# Patient Record
Sex: MALE | Race: BLACK OR AFRICAN AMERICAN | NOT HISPANIC OR LATINO
[De-identification: names, ages, dates, MRNs, and addresses within clinical notes are randomized per-mention and may not be internally consistent; named-entity substitution may affect disease eponyms.]

---

## 2018-04-06 ENCOUNTER — TRANSCRIPTION ENCOUNTER (OUTPATIENT)
Age: 53
End: 2018-04-06

## 2018-04-06 ENCOUNTER — INPATIENT (INPATIENT)
Facility: HOSPITAL | Age: 53
LOS: 1 days | Discharge: HOME | End: 2018-04-08
Attending: INTERNAL MEDICINE | Admitting: INTERNAL MEDICINE
Payer: COMMERCIAL

## 2018-04-06 VITALS
HEART RATE: 97 BPM | RESPIRATION RATE: 18 BRPM | SYSTOLIC BLOOD PRESSURE: 137 MMHG | TEMPERATURE: 99 F | DIASTOLIC BLOOD PRESSURE: 100 MMHG

## 2018-04-06 DIAGNOSIS — R07.9 CHEST PAIN, UNSPECIFIED: ICD-10-CM

## 2018-04-06 DIAGNOSIS — D15.0 BENIGN NEOPLASM OF THYMUS: ICD-10-CM

## 2018-04-06 LAB
ALBUMIN SERPL ELPH-MCNC: 4.5 G/DL — SIGNIFICANT CHANGE UP (ref 3.3–5)
ALP SERPL-CCNC: 76 U/L — SIGNIFICANT CHANGE UP (ref 40–120)
ALT FLD-CCNC: 50 U/L — HIGH (ref 4–41)
ANISOCYTOSIS BLD QL: SLIGHT — SIGNIFICANT CHANGE UP
AST SERPL-CCNC: 31 U/L — SIGNIFICANT CHANGE UP (ref 4–40)
BASOPHILS # BLD AUTO: 0.06 K/UL — SIGNIFICANT CHANGE UP (ref 0–0.2)
BASOPHILS NFR BLD AUTO: 0.8 % — SIGNIFICANT CHANGE UP (ref 0–2)
BASOPHILS NFR SPEC: 0 % — SIGNIFICANT CHANGE UP (ref 0–2)
BILIRUB SERPL-MCNC: 0.3 MG/DL — SIGNIFICANT CHANGE UP (ref 0.2–1.2)
BUN SERPL-MCNC: 13 MG/DL — SIGNIFICANT CHANGE UP (ref 7–23)
CALCIUM SERPL-MCNC: 9.8 MG/DL — SIGNIFICANT CHANGE UP (ref 8.4–10.5)
CHLORIDE SERPL-SCNC: 98 MMOL/L — SIGNIFICANT CHANGE UP (ref 98–107)
CK MB BLD-MCNC: 1.09 NG/ML — SIGNIFICANT CHANGE UP (ref 1–6.6)
CK MB BLD-MCNC: SIGNIFICANT CHANGE UP (ref 0–2.5)
CK SERPL-CCNC: 92 U/L — SIGNIFICANT CHANGE UP (ref 30–200)
CO2 SERPL-SCNC: 29 MMOL/L — SIGNIFICANT CHANGE UP (ref 22–31)
CREAT SERPL-MCNC: 1.04 MG/DL — SIGNIFICANT CHANGE UP (ref 0.5–1.3)
EOSINOPHIL # BLD AUTO: 0.13 K/UL — SIGNIFICANT CHANGE UP (ref 0–0.5)
EOSINOPHIL NFR BLD AUTO: 1.7 % — SIGNIFICANT CHANGE UP (ref 0–6)
EOSINOPHIL NFR FLD: 1.4 % — SIGNIFICANT CHANGE UP (ref 0–6)
GLUCOSE SERPL-MCNC: 114 MG/DL — HIGH (ref 70–99)
HCT VFR BLD CALC: 56.2 % — HIGH (ref 39–50)
HGB BLD-MCNC: 16.9 G/DL — SIGNIFICANT CHANGE UP (ref 13–17)
IMM GRANULOCYTES # BLD AUTO: 0.01 # — SIGNIFICANT CHANGE UP
IMM GRANULOCYTES NFR BLD AUTO: 0.1 % — SIGNIFICANT CHANGE UP (ref 0–1.5)
LYMPHOCYTES # BLD AUTO: 2.57 K/UL — SIGNIFICANT CHANGE UP (ref 1–3.3)
LYMPHOCYTES # BLD AUTO: 33.3 % — SIGNIFICANT CHANGE UP (ref 13–44)
LYMPHOCYTES NFR SPEC AUTO: 18.6 % — SIGNIFICANT CHANGE UP (ref 13–44)
MANUAL SMEAR VERIFICATION: SIGNIFICANT CHANGE UP
MCHC RBC-ENTMCNC: 21.6 PG — LOW (ref 27–34)
MCHC RBC-ENTMCNC: 30.1 % — LOW (ref 32–36)
MCV RBC AUTO: 72 FL — LOW (ref 80–100)
MONOCYTES # BLD AUTO: 0.62 K/UL — SIGNIFICANT CHANGE UP (ref 0–0.9)
MONOCYTES NFR BLD AUTO: 8 % — SIGNIFICANT CHANGE UP (ref 2–14)
MONOCYTES NFR BLD: 7.2 % — SIGNIFICANT CHANGE UP (ref 2–9)
NEUTROPHIL AB SER-ACNC: 66.4 % — SIGNIFICANT CHANGE UP (ref 43–77)
NEUTROPHILS # BLD AUTO: 4.32 K/UL — SIGNIFICANT CHANGE UP (ref 1.8–7.4)
NEUTROPHILS NFR BLD AUTO: 56.1 % — SIGNIFICANT CHANGE UP (ref 43–77)
NRBC # FLD: 0 — SIGNIFICANT CHANGE UP
PLATELET # BLD AUTO: 282 K/UL — SIGNIFICANT CHANGE UP (ref 150–400)
PLATELET COUNT - ESTIMATE: NORMAL — SIGNIFICANT CHANGE UP
PMV BLD: 10.3 FL — SIGNIFICANT CHANGE UP (ref 7–13)
POLYCHROMASIA BLD QL SMEAR: SLIGHT — SIGNIFICANT CHANGE UP
POTASSIUM SERPL-MCNC: 4.4 MMOL/L — SIGNIFICANT CHANGE UP (ref 3.5–5.3)
POTASSIUM SERPL-SCNC: 4.4 MMOL/L — SIGNIFICANT CHANGE UP (ref 3.5–5.3)
PROT SERPL-MCNC: 8.1 G/DL — SIGNIFICANT CHANGE UP (ref 6–8.3)
RBC # BLD: 7.81 M/UL — HIGH (ref 4.2–5.8)
RBC # FLD: 18 % — HIGH (ref 10.3–14.5)
REVIEW TO FOLLOW: YES — SIGNIFICANT CHANGE UP
SMUDGE CELLS # BLD: PRESENT — SIGNIFICANT CHANGE UP
SODIUM SERPL-SCNC: 139 MMOL/L — SIGNIFICANT CHANGE UP (ref 135–145)
TROPONIN T SERPL-MCNC: < 0.06 NG/ML — SIGNIFICANT CHANGE UP (ref 0–0.06)
TROPONIN T SERPL-MCNC: < 0.06 NG/ML — SIGNIFICANT CHANGE UP (ref 0–0.06)
VARIANT LYMPHS # BLD: 6.4 % — SIGNIFICANT CHANGE UP
WBC # BLD: 7.71 K/UL — SIGNIFICANT CHANGE UP (ref 3.8–10.5)
WBC # FLD AUTO: 7.71 K/UL — SIGNIFICANT CHANGE UP (ref 3.8–10.5)

## 2018-04-06 PROCEDURE — 74174 CTA ABD&PLVS W/CONTRAST: CPT | Mod: 26

## 2018-04-06 PROCEDURE — 71275 CT ANGIOGRAPHY CHEST: CPT | Mod: 26

## 2018-04-06 RX ORDER — ACETAMINOPHEN 500 MG
650 TABLET ORAL ONCE
Qty: 0 | Refills: 0 | Status: COMPLETED | OUTPATIENT
Start: 2018-04-06 | End: 2018-04-06

## 2018-04-06 RX ORDER — LABETALOL HCL 100 MG
10 TABLET ORAL ONCE
Qty: 0 | Refills: 0 | Status: DISCONTINUED | OUTPATIENT
Start: 2018-04-06 | End: 2018-04-06

## 2018-04-06 RX ORDER — ASPIRIN/CALCIUM CARB/MAGNESIUM 324 MG
324 TABLET ORAL ONCE
Qty: 0 | Refills: 0 | Status: COMPLETED | OUTPATIENT
Start: 2018-04-06 | End: 2018-04-06

## 2018-04-06 RX ADMIN — Medication 324 MILLIGRAM(S): at 14:54

## 2018-04-06 RX ADMIN — Medication 650 MILLIGRAM(S): at 14:54

## 2018-04-06 NOTE — H&P ADULT - NSHPLABSRESULTS_GEN_ALL_CORE
16.9   7.71  )-----------( 282      ( 06 Apr 2018 13:38 )             56.2     04-06    139  |  98  |  13  ----------------------------<  114<H>  4.4   |  29  |  1.04    Ca    9.8      06 Apr 2018 13:38    TPro  8.1  /  Alb  4.5  /  TBili  0.3  /  DBili  x   /  AST  31  /  ALT  50<H>  /  AlkPhos  76  04-06    CARDIAC MARKERS ( 06 Apr 2018 13:38 )  x     / < 0.06 ng/mL / x     / x     / x          < from: CT Angio Abdomen and Pelvis w/ IV Cont (04.06.18 @ 13:41) >    There is no aortic dissection.    There is a 1.9 cm isodense lesion in the anterior mediastinum. Exact   etiology is unclear. Differential diagnoses includes lymph node versus a   thymoma.    < end of copied text >    EKG NSR @ 1st AVB

## 2018-04-06 NOTE — H&P ADULT - RS GEN PE MLT RESP DETAILS PC
airway patent/no wheezes/no rhonchi/good air movement/breath sounds equal/clear to auscultation bilaterally/no rales

## 2018-04-06 NOTE — CONSULT NOTE ADULT - SUBJECTIVE AND OBJECTIVE BOX
HPI:  52 year old male with no significant pmh p/w chest pressure. States started 5 days ago but yesterday was worse. Left side chest pressure radiating to back and right arm, 6/10, associated with SOB, dizziness, palpitations, and is intermittent in nature, episodes lasting several minutes.  Denies numbness, tingling, orthopnea, PND, cough, abd pain, N/V, diarrhea, constipation, weight change, headache, visual change, fever, chills, flank pain, edema. (06 Apr 2018 15:40)      PAST MEDICAL & SURGICAL HISTORY:  No pertinent past medical history  No significant past surgical history      REVIEW OF SYSTEMS      General:No Weight change/ Fatigue/ HA/Dizzy	    Skin/Breast: No Rashes/ Lesions/ Masses  	  Ophthalmologic: No Blurry vision/ Glaucoma/ Blindness  	  ENMT: No Hearing loss/ Drainage/ Lesions	    Respiratory and Thorax: No Cough/ Wheezing/ SOB/ Hemoptysis/ Sputum production  	  Cardiovascular: No Chest pain/ Palpitations/ Diaphoresis	    Gastrointestinal: No Nausea/ Vomiting/ Constipation/ Appetite Change	    Genitourinary: No Heamturia/ Dysuria/ Frequency change/ Impotence	    Musculoskeletal: No Pain/ Weakness/ Claudication	    Neurological: No Seizures/ TIA/CVA/ Parastesias	    Psychiatric: No Dementia/ Depression/ SI/HI	    Hematology/Lymphatics: No hx of bleeding/ Edema	    Endocrine:	No Hyperglycemia/ Hypoglycemia    Allergic/Immunologic:	 No Anaphylaxis/ Intolerance/ Recent illnesses    MEDICATIONS  (STANDING):    MEDICATIONS  (PRN):      Allergies    No Known Allergies    Intolerances        SOCIAL HISTORY:  Occupation:  Smoking Hx: denies  Etoh Hx: denies  IVDA Hx: denies    FAMILY HISTORY:  No pertinent family history in first degree relatives    unless noted, no significant family hx with Mother, Father, Siblings    Vital Signs Last 24 Hrs  T(C): 36.8 (06 Apr 2018 16:40), Max: 37 (06 Apr 2018 12:42)  T(F): 98.2 (06 Apr 2018 16:40), Max: 98.6 (06 Apr 2018 12:42)  HR: 67 (06 Apr 2018 16:40) (67 - 97)  BP: 133/88 (06 Apr 2018 16:40) (133/88 - 164/95)  BP(mean): --  RR: 18 (06 Apr 2018 16:40) (16 - 18)  SpO2: 98% (06 Apr 2018 16:40) (98% - 100%)    General: WN/WD NAD  Neurology: Awake, nonfocal, DIAZ x 4  Eyes: Scleras clear, PERRLA/ EOMI, Gross vision intact  ENT:Gross hearing intact, grossly patent pharynx, no stridor  Neck: Neck supple, trachea midline, No JVD,   Respiratory: CTA B/L, No wheezing, rales, rhonchi  CV: RRR, S1S2, no murmurs, rubs or gallops  Abdominal: Soft, NT, ND +BS,   Extremities: No edema, + peripheral pulses  Skin: No Rashes, Hematoma, Ecchymosis  Lymphatic: No Neck, axilla, groin LAD  Psych: Oriented x 3, normal affect  Incisions:   Tubes:    LABS:                        16.9   7.71  )-----------( 282      ( 06 Apr 2018 13:38 )             56.2     04-06    139  |  98  |  13  ----------------------------<  114<H>  4.4   |  29  |  1.04    Ca    9.8      06 Apr 2018 13:38    TPro  8.1  /  Alb  4.5  /  TBili  0.3  /  DBili  x   /  AST  31  /  ALT  50<H>  /  AlkPhos  76  04-06          RADIOLOGY & ADDITIONAL STUDIES:    ASSESSMENT:   52yMalePAST MEDICAL & SURGICAL HISTORY:  No pertinent past medical history  No significant past surgical history  HEALTH ISSUES - PROBLEM Dx:  Thymoma: Thymoma  Chest pain, unspecified type: Chest pain, unspecified type      HEALTH ISSUES - R/O PROBLEM Dx:      PLAN: HPI:  52 year old male with no significant pmh p/w chest pressure. States started 5 days ago but yesterday was worse. Left side chest pressure radiating to back and right arm, 6/10, associated with SOB, dizziness, palpitations, and is intermittent in nature, episodes lasting several minutes.  Denies numbness, tingling, orthopnea, PND, cough, abd pain, N/V, diarrhea, constipation, weight change, headache, visual change, fever, chills, flank pain, edema. (06 Apr 2018 15:40) CTS consulted for incidental anterior mediastinal mass      PAST MEDICAL & SURGICAL HISTORY:  No pertinent past medical history  No significant past surgical history      REVIEW OF SYSTEMS      General:No Weight change/ Fatigue/ HA/Dizzy	  Skin/Breast: No Rashes/ Lesions/ Masses	  Ophthalmologic: No Blurry vision/ Glaucoma/ Blindness  ENMT: No Hearing loss/ Drainage/ Lesions	  Respiratory and Thorax: No Cough/ Wheezing/ SOB/ Hemoptysis/ Sputum production  Cardiovascular: Chest pain/ No Palpitations/ Diaphoresis	  Gastrointestinal: No Nausea/ Vomiting/ Constipation/ Appetite Change	  Genitourinary: No Heamturia/ Dysuria/ Frequency change/ Impotence	  Musculoskeletal: No Pain/ Weakness/ Claudication	  Neurological: No Seizures/ TIA/CVA/ Parastesias	  Psychiatric: No Dementia/ Depression/ SI/HI	  Hematology/Lymphatics: No hx of bleeding/ Edema	  Endocrine:	No Hyperglycemia/ Hypoglycemia  Allergic/Immunologic:	 No Anaphylaxis/ Intolerance/ Recent illnesses    MEDICATIONS  (STANDING):    MEDICATIONS  (PRN):      Allergies    No Known Allergies    Intolerances        SOCIAL HISTORY:  Occupation:  Smoking Hx: denies  Etoh Hx: denies  IVDA Hx: denies    FAMILY HISTORY:  No pertinent family history in first degree relatives    unless noted, no significant family hx with Mother, Father, Siblings    Vital Signs Last 24 Hrs  T(C): 36.8 (06 Apr 2018 16:40), Max: 37 (06 Apr 2018 12:42)  T(F): 98.2 (06 Apr 2018 16:40), Max: 98.6 (06 Apr 2018 12:42)  HR: 67 (06 Apr 2018 16:40) (67 - 97)  BP: 133/88 (06 Apr 2018 16:40) (133/88 - 164/95)  BP(mean): --  RR: 18 (06 Apr 2018 16:40) (16 - 18)  SpO2: 98% (06 Apr 2018 16:40) (98% - 100%)    General: WN/WD NAD  Neurology: Awake, nonfocal, DIAZ x 4  Eyes: Scleras clear, PERRLA/ EOMI, Gross vision intact  ENT:Gross hearing intact, grossly patent pharynx, no stridor  Neck: Neck supple, trachea midline, No JVD,   Respiratory: CTA B/L, No wheezing, rales, rhonchi  CV: RRR, S1S2, no murmurs, rubs or gallops  Abdominal: Soft, NT, ND +BS,   Extremities: No edema, + peripheral pulses  Skin: No Rashes, Hematoma, Ecchymosis  Lymphatic: No Neck, axilla, groin LAD  Psych: Oriented x 3, normal affect      LABS:                        16.9   7.71  )-----------( 282      ( 06 Apr 2018 13:38 )             56.2     04-06    139  |  98  |  13  ----------------------------<  114<H>  4.4   |  29  |  1.04    Ca    9.8      06 Apr 2018 13:38    TPro  8.1  /  Alb  4.5  /  TBili  0.3  /  DBili  x   /  AST  31  /  ALT  50<H>  /  AlkPhos  76  04-06          RADIOLOGY & ADDITIONAL STUDIES:    ASSESSMENT:   52yMalePAST MEDICAL & SURGICAL HISTORY:  No pertinent past medical history  No significant past surgical history  HEALTH ISSUES - PROBLEM Dx:  Thymoma: Thymoma  Chest pain, unspecified type: Chest pain, unspecified type      HEALTH ISSUES - R/O PROBLEM Dx:      PLAN:

## 2018-04-06 NOTE — ED PROVIDER NOTE - MEDICAL DECISION MAKING DETAILS
52M with no sig PMH p/w chest, back and arm pain with large BP differential in b/l arms, concerning for aortic dissection. EKG NSR without ST changes. Will check STAT CT angio chest/abd/pain, cbc cmp ce. If CT neg will admit for ACS rule out

## 2018-04-06 NOTE — ED ADULT TRIAGE NOTE - CHIEF COMPLAINT QUOTE
Pt c/o a feeling of heaviness on his chest that is radiating to back--since monday and pain got worse last PM  Pt states he has palpitations--feels heart jumping

## 2018-04-06 NOTE — H&P ADULT - HISTORY OF PRESENT ILLNESS
52 year old male with no significant pmh p/w chest pressure. States started 5 days ago but yesterday was worse. Left side chest pressure radiating to back and right arm, 6/10, associated with SOB, dizziness, palpitations, and is intermittent in nature, episodes lasting several minutes.  Denies numbness, tingling, orthopnea, PND, cough, abd pain, N/V, diarrhea, constipation, weight change, headache, visual change, fever, chills, flank pain, edema.

## 2018-04-06 NOTE — H&P ADULT - ASSESSMENT
52 year old male with no significant pmh p/w chest pressure  admitted to r/o ACS and incidental finding of possible thymoma.

## 2018-04-06 NOTE — ED PROVIDER NOTE - ATTENDING CONTRIBUTION TO CARE
DR. BLOCH, ATTENDING MD-  I performed a face to face bedside interview with patient regarding history of present illness, review of symptoms and past medical history. I completed an independent physical exam.  I have discussed patient's plan of care with the resident.  patient with cp rad to back- WEll appearing NAD HEENT nml heart sounds s1s2s4, lungs clear, abd soft nontender, pulses present bilaterally, neuro nonfocal

## 2018-04-06 NOTE — ED PROVIDER NOTE - PROGRESS NOTE DETAILS
Jorge PGY-2: CT- A without evidence of aortic dissection; found to have anterior mediastinal lesion and patient notified. Pt admitted for ACS evaluation/stress test. DIscussed with Dr. Fraga.

## 2018-04-06 NOTE — ED PROVIDER NOTE - OBJECTIVE STATEMENT
52M no PMH p/w one day of chest pressure radiating to the back that is constant, does not change with movement, not worse with exertion. This morning pt developed a cramping sensation in the right arm as well which prompted him to come to the ED. Reports feeling dizzy and nauseas but no vomiting, diaphoresis, abd pain, diarrhea, dysuria, dyspnea on exertion, lower extremity edema. No prior cardiac workup. Never smoker. No history of ACS in family.

## 2018-04-06 NOTE — H&P ADULT - ATTENDING COMMENTS
Patient seen and examined.  Agree with above pa note.  patient admitted with chest pain  cta was negative for pe  incidental chest mass seen  no active cp  Patient denies any chest pain, dyspnea, palpitations, cough, syncope, edema, exertional symptoms, nausea, abdominal pain, fever, chills,  or rash.  Denies any history of CAD, MI, valve disease, cardiomyopathy, or congenital heart disease.    vitals stable  nad aao x3 nc/at neck supple no jvd  cv s1s2 rrr lungs clear b/kl  abd soft, nt  ext no edema    A/P    Chest Pain  chest mass        chest pain  cta negative for pe  exercise stress testing to r/o ischemic heart disease  cts note appreciated  outpt f/u for possible thymoma  dvt ppx  med eval

## 2018-04-06 NOTE — H&P ADULT - NSHPREVIEWOFSYSTEMS_GEN_ALL_CORE
Denies numbness, tingling, orthopnea, PND, cough, abd pain, N/V, diarrhea, constipation, weight change, headache, visual change, fever, chills, flank pain, edema.

## 2018-04-06 NOTE — PATIENT PROFILE ADULT. - AS SC BRADEN SENSORY
Breath sounds are clear, no distress present, no wheeze, rales, rhonchi or tachypnea. Normal rate and effort. (4) no impairment

## 2018-04-07 ENCOUNTER — TRANSCRIPTION ENCOUNTER (OUTPATIENT)
Age: 53
End: 2018-04-07

## 2018-04-07 DIAGNOSIS — R73.09 OTHER ABNORMAL GLUCOSE: ICD-10-CM

## 2018-04-07 LAB
BUN SERPL-MCNC: 18 MG/DL — SIGNIFICANT CHANGE UP (ref 7–23)
CALCIUM SERPL-MCNC: 9.1 MG/DL — SIGNIFICANT CHANGE UP (ref 8.4–10.5)
CHLORIDE SERPL-SCNC: 100 MMOL/L — SIGNIFICANT CHANGE UP (ref 98–107)
CHOLEST SERPL-MCNC: 160 MG/DL — SIGNIFICANT CHANGE UP (ref 120–199)
CO2 SERPL-SCNC: 25 MMOL/L — SIGNIFICANT CHANGE UP (ref 22–31)
CREAT SERPL-MCNC: 1.12 MG/DL — SIGNIFICANT CHANGE UP (ref 0.5–1.3)
GLUCOSE SERPL-MCNC: 101 MG/DL — HIGH (ref 70–99)
HBA1C BLD-MCNC: 5.8 % — HIGH (ref 4–5.6)
HCT VFR BLD CALC: 52.7 % — HIGH (ref 39–50)
HDLC SERPL-MCNC: 50 MG/DL — SIGNIFICANT CHANGE UP (ref 35–55)
HGB BLD-MCNC: 15.5 G/DL — SIGNIFICANT CHANGE UP (ref 13–17)
LIPID PNL WITH DIRECT LDL SERPL: 104 MG/DL — SIGNIFICANT CHANGE UP
MCHC RBC-ENTMCNC: 21.3 PG — LOW (ref 27–34)
MCHC RBC-ENTMCNC: 29.4 % — LOW (ref 32–36)
MCV RBC AUTO: 72.5 FL — LOW (ref 80–100)
NRBC # FLD: 0 — SIGNIFICANT CHANGE UP
PLATELET # BLD AUTO: 261 K/UL — SIGNIFICANT CHANGE UP (ref 150–400)
PMV BLD: 9.9 FL — SIGNIFICANT CHANGE UP (ref 7–13)
POTASSIUM SERPL-MCNC: 4.3 MMOL/L — SIGNIFICANT CHANGE UP (ref 3.5–5.3)
POTASSIUM SERPL-SCNC: 4.3 MMOL/L — SIGNIFICANT CHANGE UP (ref 3.5–5.3)
RBC # BLD: 7.27 M/UL — HIGH (ref 4.2–5.8)
RBC # FLD: 17.5 % — HIGH (ref 10.3–14.5)
SODIUM SERPL-SCNC: 137 MMOL/L — SIGNIFICANT CHANGE UP (ref 135–145)
TRIGL SERPL-MCNC: 89 MG/DL — SIGNIFICANT CHANGE UP (ref 10–149)
WBC # BLD: 6.6 K/UL — SIGNIFICANT CHANGE UP (ref 3.8–10.5)
WBC # FLD AUTO: 6.6 K/UL — SIGNIFICANT CHANGE UP (ref 3.8–10.5)

## 2018-04-07 RX ORDER — ACETAMINOPHEN 500 MG
650 TABLET ORAL EVERY 6 HOURS
Qty: 0 | Refills: 0 | Status: DISCONTINUED | OUTPATIENT
Start: 2018-04-07 | End: 2018-04-08

## 2018-04-07 RX ADMIN — Medication 650 MILLIGRAM(S): at 07:29

## 2018-04-07 RX ADMIN — Medication 650 MILLIGRAM(S): at 06:55

## 2018-04-07 NOTE — CONSULT NOTE ADULT - ATTENDING COMMENTS
discussed with patient in detail, all questions answered   discussed with family at bedside in detail

## 2018-04-07 NOTE — PROGRESS NOTE ADULT - SUBJECTIVE AND OBJECTIVE BOX
S: Pt without complaints.  Pain improved but did require tylenol this AM.     O: Vital Signs Last 24 Hrs  T(C): 36.8 (07 Apr 2018 05:19), Max: 37 (06 Apr 2018 12:42)  T(F): 98.3 (07 Apr 2018 05:19), Max: 98.6 (06 Apr 2018 12:42)  HR: 65 (07 Apr 2018 05:19) (64 - 97)  BP: 123/73 (07 Apr 2018 05:19) (119/73 - 164/95)  BP(mean): --  RR: 18 (07 Apr 2018 05:19) (16 - 18)  SpO2: 100% (07 Apr 2018 05:19) (98% - 100%)    NAD  Grossly AAOx3  Sitting comfortably in bed

## 2018-04-07 NOTE — CONSULT NOTE ADULT - SUBJECTIVE AND OBJECTIVE BOX
Patient is a 52y old  Male who presents with a chief complaint of Chest pain    HPI:    52 year old male with no significant pmh p/w chest pressure. States started 5 days ago but yesterday was worse. Left side chest pressure radiating to back and right arm, 6/10, associated with SOB, dizziness, palpitations, and is intermittent in nature, episodes lasting several minutes.  Denies numbness, tingling, orthopnea, PND, cough, abd pain, nausea, vomiting, diarrhea, constipation, weight change, headache, visual change, fever, chills, flank pain, edema. (06 Apr 2018 15:40)      PAST MEDICAL & SURGICAL HISTORY:  No pertinent past medical history  No significant past surgical history      Review of Systems:   CONSTITUTIONAL: No fever, weight loss, or fatigue  EYES: No eye pain, visual disturbances, or discharge  ENMT:  No difficulty hearing, tinnitus, vertigo; No sinus or throat pain  NECK: No pain or stiffness  RESPIRATORY: No cough, wheezing, chills or hemoptysis; No shortness of breath  CARDIOVASCULAR: see above HPI  GASTROINTESTINAL: No abdominal or epigastric pain. No nausea, vomiting, or hematemesis; No diarrhea or constipation. No melena or hematochezia.  GENITOURINARY: No dysuria, frequency, hematuria, or incontinence  NEUROLOGICAL: No headaches, memory loss, loss of strength, numbness, or tremors  SKIN: No itching, burning, rashes, or lesions   LYMPH NODES: No enlarged glands  ENDOCRINE: No heat or cold intolerance; No hair loss  MUSCULOSKELETAL: No joint pain or swelling; No muscle, back, or extremity pain  PSYCHIATRIC: No depression, anxiety, mood swings, or difficulty sleeping  HEME/LYMPH: No easy bruising, or bleeding gums  ALLERGY AND IMMUNOLOGIC: No hives or eczema    Allergies    No Known Allergies      Social History: nonsmoker  no IVDA  no ETOH   lives with family    FAMILY HISTORY:  No pertinent family history in first degree relatives      MEDICATIONS  (STANDING):    MEDICATIONS  (PRN):  acetaminophen   Tablet. 650 milliGRAM(s) Oral every 6 hours PRN Mild Pain (1 - 3)      CAPILLARY BLOOD GLUCOSE        I&O's Summary      PHYSICAL EXAM:  GENERAL: NAD, well-developed  HEAD:  Atraumatic, Normocephalic  EYES: EOMI, PERRLA, conjunctiva and sclera clear  NECK: Supple, No JVD  CHEST/LUNG: Clear to auscultation bilaterally; No wheeze  HEART: S1S2; No rubs, or gallops, no murmurs  ABDOMEN: Soft, Nontender, Nondistended; Bowel sounds present  EXTREMITIES:  + Peripheral Pulses, No clubbing or cyanosis, no edema  PSYCH: AO x 3,   NEUROLOGY: Alert, no focal motor or sensory deficits  SKIN: No rashes or lesions    LABS:                        15.5   6.60  )-----------( 261      ( 07 Apr 2018 07:10 )             52.7     04-07    137  |  100  |  18  ----------------------------<  101<H>  4.3   |  25  |  1.12    Ca    9.1      07 Apr 2018 07:10    TPro  8.1  /  Alb  4.5  /  TBili  0.3  /  DBili  x   /  AST  31  /  ALT  50<H>  /  AlkPhos  76  04-06      CARDIAC MARKERS ( 06 Apr 2018 20:03 )  x     / < 0.06 ng/mL / 92 u/L / 1.09 ng/mL / x      CARDIAC MARKERS ( 06 Apr 2018 13:38 )  x     / < 0.06 ng/mL / x     / x     / x              RADIOLOGY & ADDITIONAL TESTS:    Consultant(s) Notes Reviewed:      Care Discussed with Consultants/Other Providers:

## 2018-04-07 NOTE — DISCHARGE NOTE ADULT - ADDITIONAL INSTRUCTIONS
Pt should follow up with Dr. Xiao in 3-4 weeks after discharge with a repeat CT chest noncontrast.  Pt to call [phone] to make an appointment.

## 2018-04-07 NOTE — CONSULT NOTE ADULT - ASSESSMENT
52 year old male with no significant pmh p/w chest pressure  admitted to r/o ACS and incidental finding of possible thymoma.
52M no PMH admitted for r/o ACS found to have incidental med mass on CT.    - Would f/u as outpatient if ER stay is uncomplicated.   - Please have patient follow up as outpatient with Dr. Xiao. Patient is to call [phone] for an appointment with Dr. Xiao  - Will d/w Dr. Dameon Jean Baptiste PAC

## 2018-04-07 NOTE — PROGRESS NOTE ADULT - SUBJECTIVE AND OBJECTIVE BOX
CARDIOLOGY FOLLOW UP - Dr. Fraga    CC no sob   c.o intermittent chest pain       PHYSICAL EXAM:  T(C): 36.8 (04-07-18 @ 05:19), Max: 37 (04-06-18 @ 12:42)  HR: 65 (04-07-18 @ 05:19) (64 - 97)  BP: 123/73 (04-07-18 @ 05:19) (119/73 - 164/95)  RR: 18 (04-07-18 @ 05:19) (16 - 18)  SpO2: 100% (04-07-18 @ 05:19) (98% - 100%)  Wt(kg): --  I&O's Summary      Appearance: Normal	  Cardiovascular: Normal S1 S2,RRR, No JVD, No murmurs  Respiratory: Lungs clear to auscultation	  Gastrointestinal:  Soft, Non-tender, + BS	  Extremities: Normal range of motion, No clubbing, cyanosis or edema        MEDICATIONS  (STANDING):      TELEMETRY: NSR  	    ECG:  	  RADIOLOGY:   DIAGNOSTIC TESTING:  [ ] Echocardiogram:  [ ]  Catheterization:  [ ] Stress Test:    OTHER: 	  < from: CT Angio Abdomen and Pelvis w/ IV Cont (04.06.18 @ 13:41) >    Impression: There is no aortic dissection.    There is a 1.9 cm isodense lesion in the anterior mediastinum. Exact   etiology is unclear. Differential diagnoses includes lymph node versus a   thymoma.              < end of copied text >    LABS:	 	        CKMB: 1.09 ng/mL (04-06 @ 20:03)                          15.5   6.60  )-----------( 261      ( 07 Apr 2018 07:10 )             52.7     04-07    137  |  100  |  18  ----------------------------<  101<H>  4.3   |  25  |  1.12    Ca    9.1      07 Apr 2018 07:10    TPro  8.1  /  Alb  4.5  /  TBili  0.3  /  DBili  x   /  AST  31  /  ALT  50<H>  /  AlkPhos  76  04-06

## 2018-04-07 NOTE — DISCHARGE NOTE ADULT - CARE PLAN
Principal Discharge DX:	Chest pain  Goal:	prevent reoccurrence  Assessment and plan of treatment:	Cardiac enzymes negative, echo normal LV function  Secondary Diagnosis:	Mediastinal mass  Assessment and plan of treatment:	follow up with Dr. Xiao Cardiothoracic Surgeon in 3-4 weeks. Please have a repeat CT chest noncontrast prior to your appointment.  call [phone] to make an appointment. Principal Discharge DX:	Chest pain  Goal:	prevent reoccurrence  Assessment and plan of treatment:	Cardiac enzymes negative, echo normal LV function, stress test normal study  Secondary Diagnosis:	Mediastinal mass  Assessment and plan of treatment:	follow up with Dr. Xiao Cardiothoracic Surgeon in 3-4 weeks. Please have a repeat CT chest noncontrast prior to your appointment.  call [phone] to make an appointment.

## 2018-04-07 NOTE — DISCHARGE NOTE ADULT - CARE PROVIDER_API CALL
Umesh Xiao), Surgery; Thoracic Surgery  [id number] [address] [place] [address]  [place] [address]  Phone: [phone]  Fax: [phone]    Umesh Fraga (MD), Cardiovascular Disease; Internal Medicine; Interventional Cardiology; Nuclear Cardiology  [address] [facility] [address]  [place] [address]  Phone: [phone]  Fax: [phone]

## 2018-04-07 NOTE — PROGRESS NOTE ADULT - ASSESSMENT
52 year old male with no significant admitted with chest pain, also found to have  anterior mediastinal mass     1. chest pain   c.o intermittent chest pain , no sob   ekg revealing no evidence of ACS   no events on tele   pending echo to r.o cmp/ valvular diease   pending stress test for ischemic eval   CTA negative for pe  / no aortic dissection/ + mediastinal mass ? possible thymoma  start low dose ASA       2. + mediastinal mass ? possible thymoma  plan for possible intervention as outpt per CTS   pending cardiac clearance , ischemic work up in progress    dvt ppx

## 2018-04-07 NOTE — DISCHARGE NOTE ADULT - CARE PROVIDERS DIRECT ADDRESSES
[email or web address]

## 2018-04-07 NOTE — DISCHARGE NOTE ADULT - PATIENT PORTAL LINK FT
You can access the [facility] Patient Portal, offered by [facility], by registering with the following website: [email or web address]

## 2018-04-07 NOTE — DISCHARGE NOTE ADULT - HOSPITAL COURSE
52 year old male with no significant pmh p/w chest pressure. States started 5 days ago but yesterday was worse. Left side chest pressure radiating to back and right arm, 6/10, associated with SOB, dizziness, palpitations, and is intermittent in nature, episodes lasting several minutes.  Denies numbness, tingling, orthopnea, PND, cough, abd pain, N/V, diarrhea, constipation, weight change, headache, visual change, fever, chills, flank pain, edema.   4/7 Cardio: 1. chest pain   c.o intermittent chest pain , no sob   ekg revealing no evidence of ACS   no events on tele   pending echo to r.o cmp/ valvular diease   pending stress test for ischemic eval   CTA negative for pe  / no aortic dissection/ + mediastinal mass ? possible thymoma  start low dose ASA       2. + mediastinal mass ? possible thymoma  plan for possible intervention as outpt per CTS   pending cardiac clearance , ischemic work up in progress    CTS: -No acute surgical intervention at this time  -Pt should follow up with Dr. Xiao in 3-4 weeks after discharge with a repeat CT chest noncontrast.  Pt to call [phone] to make an appointment  -While patient is admitted, please have cardiology evaluate for cardiac clearance for possible elective operation (Uniportal VATS mediastinal mass excision) 52 year old male with no significant pmh p/w chest pressure. States started 5 days ago but yesterday was worse. Left side chest pressure radiating to back and right arm, 6/10, associated with SOB, dizziness, palpitations, and is intermittent in nature, episodes lasting several minutes.  Denies numbness, tingling, orthopnea, PND, cough, abd pain, N/V, diarrhea, constipation, weight change, headache, visual change, fever, chills, flank pain, edema.   4/7 Cardio: 1. chest pain   c.o intermittent chest pain , no sob   ekg revealing no evidence of ACS   no events on tele   pending echo to r.o cmp/ valvular diease   pending stress test for ischemic eval   CTA negative for pe  / no aortic dissection/ + mediastinal mass ? possible thymoma  start low dose ASA       2. + mediastinal mass ? possible thymoma  plan for possible intervention as outpt per CTS   pending cardiac clearance , ischemic work up in progress    CTS: -No acute surgical intervention at this time  -Pt should follow up with Dr. Xiao in 3-4 weeks after discharge with a repeat CT chest noncontrast.  Pt to call [phone] to make an appointment  -While patient is admitted, please have cardiology evaluate for cardiac clearance for possible elective operation (Uniportal VATS mediastinal mass excision)    Echo: 1. Normal left ventricular systolic function. No segmental wall motion abnormalities. 2. Mild diastolic dysfunction (Stage I). 3. Normal right ventricular size and function.  NST: IMPRESSIONS:Normal Study  * The left ventricle was normal in size.  * Tracer uptake was homogeneous throughout the left  ventricle.  * Normal study; no evidence for myocardial infarction or  ischemia.  * Gated wall motion analysis was performed, and shows  normal wall motion.    D/w Dr. Fraga Patient stable for discharge outpt followup with Dr. Xiao.

## 2018-04-07 NOTE — PROGRESS NOTE ADULT - ASSESSMENT
A/P: 53 yo M admitted with chest pressure and r/o ACS.  Incidentally found mediastinal mass on CT scan, ? enlarged mediastinal lymph node.  -No acute surgical intervention at this time  -Pt should follow up with Dr. Xiao in 3-4 weeks after discharge with a repeat CT chest noncontrast.  Pt to call [phone] to make an appointment  -While patient is admitted, please have cardiology evaluate for cardiac clearance for possible elective operation (Uniportal VATS mediastinal mass excision)  -Patient seen and examined with Dr. Xiao who agrees

## 2018-04-07 NOTE — DISCHARGE NOTE ADULT - PLAN OF CARE
prevent reoccurrence Cardiac enzymes negative, echo normal LV function follow up with Dr. Xiao Cardiothoracic Surgeon in 3-4 weeks. Please have a repeat CT chest noncontrast prior to your appointment.  call [phone] to make an appointment. Cardiac enzymes negative, echo normal LV function, stress test normal study

## 2018-04-08 VITALS
OXYGEN SATURATION: 99 % | HEART RATE: 91 BPM | RESPIRATION RATE: 16 BRPM | TEMPERATURE: 98 F | DIASTOLIC BLOOD PRESSURE: 79 MMHG | SYSTOLIC BLOOD PRESSURE: 148 MMHG

## 2018-04-08 DIAGNOSIS — J98.59 OTHER DISEASES OF MEDIASTINUM, NOT ELSEWHERE CLASSIFIED: ICD-10-CM

## 2018-04-08 LAB
BUN SERPL-MCNC: 14 MG/DL — SIGNIFICANT CHANGE UP (ref 7–23)
CALCIUM SERPL-MCNC: 8.6 MG/DL — SIGNIFICANT CHANGE UP (ref 8.4–10.5)
CHLORIDE SERPL-SCNC: 101 MMOL/L — SIGNIFICANT CHANGE UP (ref 98–107)
CO2 SERPL-SCNC: 25 MMOL/L — SIGNIFICANT CHANGE UP (ref 22–31)
CREAT SERPL-MCNC: 1.23 MG/DL — SIGNIFICANT CHANGE UP (ref 0.5–1.3)
GLUCOSE SERPL-MCNC: 97 MG/DL — SIGNIFICANT CHANGE UP (ref 70–99)
HCT VFR BLD CALC: 51.4 % — HIGH (ref 39–50)
HGB BLD-MCNC: 15.3 G/DL — SIGNIFICANT CHANGE UP (ref 13–17)
MAGNESIUM SERPL-MCNC: 2 MG/DL — SIGNIFICANT CHANGE UP (ref 1.6–2.6)
MCHC RBC-ENTMCNC: 21.6 PG — LOW (ref 27–34)
MCHC RBC-ENTMCNC: 29.8 % — LOW (ref 32–36)
MCV RBC AUTO: 72.5 FL — LOW (ref 80–100)
NRBC # FLD: 0 — SIGNIFICANT CHANGE UP
PLATELET # BLD AUTO: 242 K/UL — SIGNIFICANT CHANGE UP (ref 150–400)
PMV BLD: 9.8 FL — SIGNIFICANT CHANGE UP (ref 7–13)
POTASSIUM SERPL-MCNC: 4.5 MMOL/L — SIGNIFICANT CHANGE UP (ref 3.5–5.3)
POTASSIUM SERPL-SCNC: 4.5 MMOL/L — SIGNIFICANT CHANGE UP (ref 3.5–5.3)
RBC # BLD: 7.09 M/UL — HIGH (ref 4.2–5.8)
RBC # FLD: 17.3 % — HIGH (ref 10.3–14.5)
SODIUM SERPL-SCNC: 136 MMOL/L — SIGNIFICANT CHANGE UP (ref 135–145)
WBC # BLD: 6.61 K/UL — SIGNIFICANT CHANGE UP (ref 3.8–10.5)
WBC # FLD AUTO: 6.61 K/UL — SIGNIFICANT CHANGE UP (ref 3.8–10.5)

## 2018-04-08 PROCEDURE — 93016 CV STRESS TEST SUPVJ ONLY: CPT | Mod: GC

## 2018-04-08 PROCEDURE — 78452 HT MUSCLE IMAGE SPECT MULT: CPT | Mod: 26

## 2018-04-08 PROCEDURE — 93306 TTE W/DOPPLER COMPLETE: CPT | Mod: 26

## 2018-04-08 PROCEDURE — 93018 CV STRESS TEST I&R ONLY: CPT | Mod: GC

## 2018-04-08 NOTE — PROGRESS NOTE ADULT - SUBJECTIVE AND OBJECTIVE BOX
Patient is a 52y old  Male who presents with a chief complaint of Chest pain (07 Apr 2018 09:41)    SUBJECTIVE / OVERNIGHT EVENTS: no overnight events    ROS:  Resp: No cough no sputum production  CVS: No chest pain no palpitations no orthopnea  GI: no N/V/D  : no dysuria, no hematuria  Neuro: no weakness no paresthesias  Heme: No petechiae no easy bruising  Msk: No joint pain no swelling  Skin: No rash no itching        MEDICATIONS  (STANDING):    MEDICATIONS  (PRN):  acetaminophen   Tablet. 650 milliGRAM(s) Oral every 6 hours PRN Mild Pain (1 - 3)        CAPILLARY BLOOD GLUCOSE        I&O's Summary      Vital Signs Last 24 Hrs  T(C): 36.3 (08 Apr 2018 05:31), Max: 36.3 (08 Apr 2018 05:31)  T(F): 97.3 (08 Apr 2018 05:31), Max: 97.3 (08 Apr 2018 05:31)  HR: 61 (08 Apr 2018 08:00) (61 - 69)  BP: 127/87 (08 Apr 2018 05:31) (120/71 - 127/87)  BP(mean): --  RR: 16 (08 Apr 2018 08:00) (16 - 18)  SpO2: 100% (08 Apr 2018 05:31) (100% - 100%)    PHYSICAL EXAM:  GENERAL: NAD, well-developed  HEAD:  Atraumatic, Normocephalic  EYES: EOMI, PERRLA, conjunctiva and sclera clear  NECK: Supple, No JVD  CHEST/LUNG: no rhonchi, no wheeze, clear to auscultation bilaterally  HEART: S1 S2; No rubs or gallops, no murmurs  ABDOMEN: Soft, Nontender, Nondistended; Bowel sounds present  EXTREMITIES:  No clubbing or cyanosis, + Peripheral Pulses,  no edema  PSYCH: AO x 3 appropriate affect  NEUROLOGY: non-focal, motor and sensory systems intact  SKIN: No rashes or lesions    LABS:                        15.3   6.61  )-----------( 242      ( 08 Apr 2018 06:18 )             51.4     04-08    136  |  101  |  14  ----------------------------<  97  4.5   |  25  |  1.23    Ca    8.6      08 Apr 2018 06:18  Mg     2.0     04-08    TPro  8.1  /  Alb  4.5  /  TBili  0.3  /  DBili  x   /  AST  31  /  ALT  50<H>  /  AlkPhos  76  04-06      CARDIAC MARKERS ( 06 Apr 2018 20:03 )  x     / < 0.06 ng/mL / 92 u/L / 1.09 ng/mL / x      CARDIAC MARKERS ( 06 Apr 2018 13:38 )  x     / < 0.06 ng/mL / x     / x     / x                All consultant(s) notes reviewed and care discussed with other providers    Contact Number, Dr Spear [phone]

## 2018-04-08 NOTE — PROGRESS NOTE ADULT - ATTENDING COMMENTS
Agree with above NP note.  cv stable  await stress testing   outpt eval for chest mass
discussed with patient in detail, all questions answered

## 2018-04-08 NOTE — PROGRESS NOTE ADULT - PROBLEM SELECTOR PLAN 1
unclear etiology  CT surgery help appreciated  agree with recommendations   patient given print out of CT scan to bring to PCP and encouraged to follow up with CT surgery Dr Xiao

## 2018-04-08 NOTE — PROGRESS NOTE ADULT - SUBJECTIVE AND OBJECTIVE BOX
CARDIOLOGY FOLLOW UP NOTE - DR. HUTCHISON    Subjective:      PHYSICAL EXAM:  T(C): 36.3 (04-08-18 @ 05:31), Max: 36.8 (04-07-18 @ 12:12)  HR: 61 (04-08-18 @ 08:00) (61 - 74)  BP: 127/87 (04-08-18 @ 05:31) (117/74 - 127/87)  RR: 16 (04-08-18 @ 08:00) (16 - 18)  SpO2: 100% (04-08-18 @ 05:31) (96% - 100%)  Wt(kg): --  I&O's Summary      Appearance: Normal	  Cardiovascular: Normal S1 S2,RRR, No JVD, No murmurs  Respiratory: Lungs clear to auscultation	  Gastrointestinal:  Soft, Non-tender, + BS	  Extremities: Normal range of motion, No clubbing, cyanosis or edema  Vascular: Peripheral pulses palpable 2+ bilaterally    MEDICATIONS  (STANDING):      TELEMETRY: 	    ECG:  	  RADIOLOGY:   DIAGNOSTIC TESTING:  [ ] Echocardiogram:  [ ] Catheterization:  [ ] Stress Test:    OTHER: 	    LABS:	 	    CARDIAC MARKERS:                                15.3   6.61  )-----------( 242      ( 08 Apr 2018 06:18 )             51.4     04-08    136  |  101  |  14  ----------------------------<  97  4.5   |  25  |  1.23    Ca    8.6      08 Apr 2018 06:18  Mg     2.0     04-08    TPro  8.1  /  Alb  4.5  /  TBili  0.3  /  DBili  x   /  AST  31  /  ALT  50<H>  /  AlkPhos  76  04-06    proBNP:     Lipid Profile:   HgA1c:

## 2018-04-08 NOTE — PROGRESS NOTE ADULT - ASSESSMENT
52 year old male with no significant admitted with chest pain, also found to have  anterior mediastinal mass     1. chest pain   cv stable  no active pain  await echo/stress  if negative d/c home   outpt f/u for mediastinal mass

## 2018-05-01 ENCOUNTER — APPOINTMENT (OUTPATIENT)
Dept: THORACIC SURGERY | Facility: CLINIC | Age: 53
End: 2018-05-01
Payer: COMMERCIAL

## 2018-05-15 ENCOUNTER — APPOINTMENT (OUTPATIENT)
Dept: THORACIC SURGERY | Facility: CLINIC | Age: 53
End: 2018-05-15
Payer: COMMERCIAL

## 2018-05-15 VITALS
DIASTOLIC BLOOD PRESSURE: 82 MMHG | HEIGHT: 68 IN | RESPIRATION RATE: 16 BRPM | SYSTOLIC BLOOD PRESSURE: 124 MMHG | WEIGHT: 190 LBS | BODY MASS INDEX: 28.79 KG/M2 | HEART RATE: 86 BPM | OXYGEN SATURATION: 99 %

## 2018-05-15 DIAGNOSIS — Z82.5 FAMILY HISTORY OF ASTHMA AND OTHER CHRONIC LOWER RESPIRATORY DISEASES: ICD-10-CM

## 2018-05-15 DIAGNOSIS — Z78.9 OTHER SPECIFIED HEALTH STATUS: ICD-10-CM

## 2018-05-15 PROCEDURE — 99205 OFFICE O/P NEW HI 60 MIN: CPT

## 2018-09-18 ENCOUNTER — APPOINTMENT (OUTPATIENT)
Dept: THORACIC SURGERY | Facility: CLINIC | Age: 53
End: 2018-09-18
Payer: COMMERCIAL

## 2018-09-18 VITALS
WEIGHT: 180 LBS | DIASTOLIC BLOOD PRESSURE: 83 MMHG | SYSTOLIC BLOOD PRESSURE: 125 MMHG | HEART RATE: 70 BPM | BODY MASS INDEX: 27.28 KG/M2 | HEIGHT: 68 IN | RESPIRATION RATE: 16 BRPM | OXYGEN SATURATION: 99 %

## 2018-09-18 PROCEDURE — 99215 OFFICE O/P EST HI 40 MIN: CPT

## 2018-10-03 ENCOUNTER — OUTPATIENT (OUTPATIENT)
Dept: OUTPATIENT SERVICES | Facility: HOSPITAL | Age: 53
LOS: 1 days | End: 2018-10-03
Payer: COMMERCIAL

## 2018-10-03 VITALS
WEIGHT: 186.07 LBS | RESPIRATION RATE: 18 BRPM | OXYGEN SATURATION: 98 % | DIASTOLIC BLOOD PRESSURE: 84 MMHG | SYSTOLIC BLOOD PRESSURE: 118 MMHG | HEART RATE: 72 BPM | TEMPERATURE: 98 F | HEIGHT: 67 IN

## 2018-10-03 DIAGNOSIS — D49.89 NEOPLASM OF UNSPECIFIED BEHAVIOR OF OTHER SPECIFIED SITES: ICD-10-CM

## 2018-10-03 DIAGNOSIS — R91.8 OTHER NONSPECIFIC ABNORMAL FINDING OF LUNG FIELD: ICD-10-CM

## 2018-10-03 DIAGNOSIS — Z98.890 OTHER SPECIFIED POSTPROCEDURAL STATES: Chronic | ICD-10-CM

## 2018-10-03 LAB
BLD GP AB SCN SERPL QL: NEGATIVE — SIGNIFICANT CHANGE UP
RH IG SCN BLD-IMP: NEGATIVE — SIGNIFICANT CHANGE UP

## 2018-10-03 PROCEDURE — 93010 ELECTROCARDIOGRAM REPORT: CPT

## 2018-10-03 RX ORDER — SODIUM CHLORIDE 9 MG/ML
1000 INJECTION, SOLUTION INTRAVENOUS
Qty: 0 | Refills: 0 | Status: DISCONTINUED | OUTPATIENT
Start: 2018-10-15 | End: 2018-10-18

## 2018-10-03 NOTE — H&P PST ADULT - VENOUS THROMBOEMBOLISM SCORE
68 yo M w hx of a fib on eliquis, htn c/o seizure.  Pt states he had htn and headache all day.  went ot bed early.  found making repetitive movements and not responding by wife.  she called 911, symptoms resolved.  pt lethargic after symptoms.  No chst pain.  no sob.  no abdominal pain.  no n/v/d
4

## 2018-10-03 NOTE — H&P PST ADULT - PROBLEM SELECTOR PLAN 1
Scheduled for surgery 10/15/18   Preop instructions provided and patient verbalizes understanding.  Labs done and results pending.  Hibiclens and Famotidine provided with instructions.   OR Booking notified of SHIVA precautions  MC requested from surgeon - pt had BW done and requested it to be faxed to PST

## 2018-10-03 NOTE — H&P PST ADULT - HISTORY OF PRESENT ILLNESS
Pt is a 52 yr old male scheduled for Flexible Bronchoscopy Right VATS Robotic Assisted Thymectomy with Dr Xiao 10/15/18. Pt found to have lung mass as incidental finding with w/u for chest pain 4/18 - Pt now denies SOB or chest pain Pt is a 52 yr old male scheduled for Flexible Bronchoscopy Right VATS Robotic Assisted Thymectomy with Dr Xiao 10/15/18. Pt found to have lung mass as incidental finding with w/u for chest pain 4/18 - Pt now denies SOB or chest pain - Pt had BW done at PCP Monday - call and confirmed same - and to be faxed to PST - T&S done

## 2018-10-03 NOTE — H&P PST ADULT - NEGATIVE NEUROLOGICAL SYMPTOMS
no paresthesias/no weakness/no generalized seizures/no transient paralysis/no focal seizures/no tremors/no syncope

## 2018-10-03 NOTE — H&P PST ADULT - NEGATIVE ENMT SYMPTOMS
no hearing difficulty/no nasal congestion/no throat pain/no dysphagia/no ear pain/no vertigo/no tinnitus

## 2018-10-03 NOTE — H&P PST ADULT - NSANTHOSAYNRD_GEN_A_CORE
No. SHIVA screening performed.  STOP BANG Legend: 0-2 = LOW Risk; 3-4 = INTERMEDIATE Risk; 5-8 = HIGH Risk

## 2018-10-05 PROBLEM — R91.8 OTHER NONSPECIFIC ABNORMAL FINDING OF LUNG FIELD: Chronic | Status: ACTIVE | Noted: 2018-10-03

## 2018-10-09 ENCOUNTER — APPOINTMENT (OUTPATIENT)
Dept: PULMONOLOGY | Facility: CLINIC | Age: 53
End: 2018-10-09
Payer: COMMERCIAL

## 2018-10-09 PROCEDURE — 94726 PLETHYSMOGRAPHY LUNG VOLUMES: CPT

## 2018-10-09 PROCEDURE — 94010 BREATHING CAPACITY TEST: CPT

## 2018-10-09 PROCEDURE — 94729 DIFFUSING CAPACITY: CPT

## 2018-10-10 ENCOUNTER — APPOINTMENT (OUTPATIENT)
Dept: PULMONOLOGY | Facility: CLINIC | Age: 53
End: 2018-10-10

## 2018-10-15 ENCOUNTER — TRANSCRIPTION ENCOUNTER (OUTPATIENT)
Age: 53
End: 2018-10-15

## 2018-10-15 ENCOUNTER — RESULT REVIEW (OUTPATIENT)
Age: 53
End: 2018-10-15

## 2018-10-15 ENCOUNTER — APPOINTMENT (OUTPATIENT)
Dept: THORACIC SURGERY | Facility: HOSPITAL | Age: 53
End: 2018-10-15
Payer: COMMERCIAL

## 2018-10-15 ENCOUNTER — INPATIENT (INPATIENT)
Facility: HOSPITAL | Age: 53
LOS: 2 days | Discharge: HOME | End: 2018-10-18
Attending: THORACIC SURGERY (CARDIOTHORACIC VASCULAR SURGERY) | Admitting: THORACIC SURGERY (CARDIOTHORACIC VASCULAR SURGERY)
Payer: COMMERCIAL

## 2018-10-15 VITALS
RESPIRATION RATE: 16 BRPM | DIASTOLIC BLOOD PRESSURE: 75 MMHG | HEIGHT: 67 IN | HEART RATE: 65 BPM | TEMPERATURE: 98 F | OXYGEN SATURATION: 96 % | WEIGHT: 186.07 LBS | SYSTOLIC BLOOD PRESSURE: 117 MMHG

## 2018-10-15 DIAGNOSIS — D49.89 NEOPLASM OF UNSPECIFIED BEHAVIOR OF OTHER SPECIFIED SITES: ICD-10-CM

## 2018-10-15 DIAGNOSIS — Z98.890 OTHER SPECIFIED POSTPROCEDURAL STATES: Chronic | ICD-10-CM

## 2018-10-15 LAB
ALBUMIN SERPL ELPH-MCNC: 3.8 G/DL — SIGNIFICANT CHANGE UP (ref 3.3–5)
ALP SERPL-CCNC: 66 U/L — SIGNIFICANT CHANGE UP (ref 40–120)
ALT FLD-CCNC: 102 U/L — HIGH (ref 4–41)
APTT BLD: 29.2 SEC — SIGNIFICANT CHANGE UP (ref 27.5–37.4)
AST SERPL-CCNC: 74 U/L — HIGH (ref 4–40)
BILIRUB SERPL-MCNC: 0.3 MG/DL — SIGNIFICANT CHANGE UP (ref 0.2–1.2)
BUN SERPL-MCNC: 10 MG/DL — SIGNIFICANT CHANGE UP (ref 7–23)
CALCIUM SERPL-MCNC: 9 MG/DL — SIGNIFICANT CHANGE UP (ref 8.4–10.5)
CHLORIDE SERPL-SCNC: 102 MMOL/L — SIGNIFICANT CHANGE UP (ref 98–107)
CO2 SERPL-SCNC: 25 MMOL/L — SIGNIFICANT CHANGE UP (ref 22–31)
CREAT SERPL-MCNC: 1.12 MG/DL — SIGNIFICANT CHANGE UP (ref 0.5–1.3)
GLUCOSE SERPL-MCNC: 125 MG/DL — HIGH (ref 70–99)
HCT VFR BLD CALC: 51.7 % — HIGH (ref 39–50)
HGB BLD-MCNC: 15.3 G/DL — SIGNIFICANT CHANGE UP (ref 13–17)
INR BLD: 0.99 — SIGNIFICANT CHANGE UP (ref 0.88–1.17)
MAGNESIUM SERPL-MCNC: 1.8 MG/DL — SIGNIFICANT CHANGE UP (ref 1.6–2.6)
MCHC RBC-ENTMCNC: 21.6 PG — LOW (ref 27–34)
MCHC RBC-ENTMCNC: 29.6 % — LOW (ref 32–36)
MCV RBC AUTO: 72.9 FL — LOW (ref 80–100)
NRBC # FLD: 0 — SIGNIFICANT CHANGE UP
PHOSPHATE SERPL-MCNC: 2.4 MG/DL — LOW (ref 2.5–4.5)
PLATELET # BLD AUTO: 231 K/UL — SIGNIFICANT CHANGE UP (ref 150–400)
PMV BLD: 9.8 FL — SIGNIFICANT CHANGE UP (ref 7–13)
POTASSIUM SERPL-MCNC: 4.1 MMOL/L — SIGNIFICANT CHANGE UP (ref 3.5–5.3)
POTASSIUM SERPL-SCNC: 4.1 MMOL/L — SIGNIFICANT CHANGE UP (ref 3.5–5.3)
PROT SERPL-MCNC: 6.9 G/DL — SIGNIFICANT CHANGE UP (ref 6–8.3)
PROTHROM AB SERPL-ACNC: 11.4 SEC — SIGNIFICANT CHANGE UP (ref 9.8–13.1)
RBC # BLD: 7.09 M/UL — HIGH (ref 4.2–5.8)
RBC # FLD: 17.7 % — HIGH (ref 10.3–14.5)
RH IG SCN BLD-IMP: NEGATIVE — SIGNIFICANT CHANGE UP
SODIUM SERPL-SCNC: 139 MMOL/L — SIGNIFICANT CHANGE UP (ref 135–145)
WBC # BLD: 12.53 K/UL — HIGH (ref 3.8–10.5)
WBC # FLD AUTO: 12.53 K/UL — HIGH (ref 3.8–10.5)

## 2018-10-15 PROCEDURE — 32673 THORACOSCOPY W/THYMUS RESECT: CPT

## 2018-10-15 PROCEDURE — 99233 SBSQ HOSP IP/OBS HIGH 50: CPT

## 2018-10-15 PROCEDURE — 71045 X-RAY EXAM CHEST 1 VIEW: CPT | Mod: 26

## 2018-10-15 PROCEDURE — 31622 DX BRONCHOSCOPE/WASH: CPT

## 2018-10-15 PROCEDURE — S2900 ROBOTIC SURGICAL SYSTEM: CPT | Mod: NC

## 2018-10-15 PROCEDURE — 32673 THORACOSCOPY W/THYMUS RESECT: CPT | Mod: AS

## 2018-10-15 PROCEDURE — 88307 TISSUE EXAM BY PATHOLOGIST: CPT | Mod: 26

## 2018-10-15 RX ORDER — HYDROMORPHONE HYDROCHLORIDE 2 MG/ML
0.5 INJECTION INTRAMUSCULAR; INTRAVENOUS; SUBCUTANEOUS
Qty: 0 | Refills: 0 | Status: DISCONTINUED | OUTPATIENT
Start: 2018-10-15 | End: 2018-10-18

## 2018-10-15 RX ORDER — FAMOTIDINE 10 MG/ML
20 INJECTION INTRAVENOUS
Qty: 0 | Refills: 0 | Status: DISCONTINUED | OUTPATIENT
Start: 2018-10-15 | End: 2018-10-18

## 2018-10-15 RX ORDER — DOCUSATE SODIUM 100 MG
100 CAPSULE ORAL THREE TIMES A DAY
Qty: 0 | Refills: 0 | Status: DISCONTINUED | OUTPATIENT
Start: 2018-10-15 | End: 2018-10-18

## 2018-10-15 RX ORDER — NALOXONE HYDROCHLORIDE 4 MG/.1ML
0.1 SPRAY NASAL
Qty: 0 | Refills: 0 | Status: DISCONTINUED | OUTPATIENT
Start: 2018-10-15 | End: 2018-10-18

## 2018-10-15 RX ORDER — HEPARIN SODIUM 5000 [USP'U]/ML
5000 INJECTION INTRAVENOUS; SUBCUTANEOUS EVERY 8 HOURS
Qty: 0 | Refills: 0 | Status: DISCONTINUED | OUTPATIENT
Start: 2018-10-15 | End: 2018-10-18

## 2018-10-15 RX ORDER — SODIUM,POTASSIUM PHOSPHATES 278-250MG
1 POWDER IN PACKET (EA) ORAL EVERY 6 HOURS
Qty: 0 | Refills: 0 | Status: COMPLETED | OUTPATIENT
Start: 2018-10-15 | End: 2018-10-16

## 2018-10-15 RX ORDER — INFLUENZA VIRUS VACCINE 15; 15; 15; 15 UG/.5ML; UG/.5ML; UG/.5ML; UG/.5ML
0.5 SUSPENSION INTRAMUSCULAR ONCE
Qty: 0 | Refills: 0 | Status: COMPLETED | OUTPATIENT
Start: 2018-10-15 | End: 2018-10-15

## 2018-10-15 RX ORDER — ONDANSETRON 8 MG/1
4 TABLET, FILM COATED ORAL EVERY 6 HOURS
Qty: 0 | Refills: 0 | Status: DISCONTINUED | OUTPATIENT
Start: 2018-10-15 | End: 2018-10-18

## 2018-10-15 RX ORDER — HYDROMORPHONE HYDROCHLORIDE 2 MG/ML
30 INJECTION INTRAMUSCULAR; INTRAVENOUS; SUBCUTANEOUS
Qty: 0 | Refills: 0 | Status: DISCONTINUED | OUTPATIENT
Start: 2018-10-15 | End: 2018-10-18

## 2018-10-15 RX ORDER — MAGNESIUM SULFATE 500 MG/ML
1 VIAL (ML) INJECTION ONCE
Qty: 0 | Refills: 0 | Status: COMPLETED | OUTPATIENT
Start: 2018-10-15 | End: 2018-10-15

## 2018-10-15 RX ORDER — DIPHENHYDRAMINE HCL 50 MG
25 CAPSULE ORAL EVERY 4 HOURS
Qty: 0 | Refills: 0 | Status: DISCONTINUED | OUTPATIENT
Start: 2018-10-15 | End: 2018-10-18

## 2018-10-15 RX ORDER — METOCLOPRAMIDE HCL 10 MG
10 TABLET ORAL ONCE
Qty: 0 | Refills: 0 | Status: DISCONTINUED | OUTPATIENT
Start: 2018-10-15 | End: 2018-10-18

## 2018-10-15 RX ADMIN — HYDROMORPHONE HYDROCHLORIDE 30 MILLILITER(S): 2 INJECTION INTRAMUSCULAR; INTRAVENOUS; SUBCUTANEOUS at 15:17

## 2018-10-15 RX ADMIN — HYDROMORPHONE HYDROCHLORIDE 30 MILLILITER(S): 2 INJECTION INTRAMUSCULAR; INTRAVENOUS; SUBCUTANEOUS at 19:10

## 2018-10-15 RX ADMIN — HEPARIN SODIUM 5000 UNIT(S): 5000 INJECTION INTRAVENOUS; SUBCUTANEOUS at 21:41

## 2018-10-15 RX ADMIN — Medication 1 PACKET(S): at 18:54

## 2018-10-15 RX ADMIN — SODIUM CHLORIDE 30 MILLILITER(S): 9 INJECTION, SOLUTION INTRAVENOUS at 15:27

## 2018-10-15 RX ADMIN — ONDANSETRON 4 MILLIGRAM(S): 8 TABLET, FILM COATED ORAL at 18:58

## 2018-10-15 RX ADMIN — Medication 100 MILLIGRAM(S): at 21:41

## 2018-10-15 RX ADMIN — FAMOTIDINE 20 MILLIGRAM(S): 10 INJECTION INTRAVENOUS at 18:37

## 2018-10-15 RX ADMIN — Medication 100 GRAM(S): at 18:37

## 2018-10-15 NOTE — ASU PREOP CHECKLIST - SELECT TESTS ORDERED
echo report, nuclear stress test report/BMP/CBC/INR/Urinalysis/PT/PTT/Type and Cross/Type and Screen/EKG

## 2018-10-15 NOTE — PROGRESS NOTE ADULT - SUBJECTIVE AND OBJECTIVE BOX
NIECY GOODWIN          MRN-[id number]    HPI:  Pt is a 52 yr old male scheduled for Flexible Bronchoscopy Right VATS Robotic Assisted Thymectomy with Dr Xiao 10/15/18. Pt found to have lung mass as incidental finding with w/u for chest pain 4/18 - Pt now denies SOB or chest pain - Pt had BW done at PCP Monday - call and confirmed same - and to be faxed to [facility] - T&S done (03 Oct 2018 16:52)      Procedure:  POD # :     Issues:        Interval/Overnight Events/ ROS  Pt remained hemodynamically stable overnight, not on any pressors or inotropes. OOB to chair, breathing comfortably with minimal pain. Ambulated several times . Denies pain, no SOB, no palpitations, no nausea/ no vomiting, no dizziness  A-line and deng d/mague         PAST MEDICAL & SURGICAL HISTORY:  Lung mass  H/O colonoscopy    Allergies    No Known Allergies    Intolerances            ***VITAL SIGNS:  Vital Signs Last 24 Hrs  T(C): 36.7 (15 Oct 2018 15:13), Max: 36.9 (15 Oct 2018 08:03)  T(F): 98 (15 Oct 2018 15:13), Max: 98.4 (15 Oct 2018 08:03)  HR: 75 (15 Oct 2018 15:13) (65 - 75)  BP: 114/73 (15 Oct 2018 15:13) (114/73 - 117/75)  BP(mean): 82 (15 Oct 2018 15:13) (82 - 82)  RR: 7 (15 Oct 2018 15:13) (7 - 16)  SpO2: 98% (15 Oct 2018 15:13) (96% - 98%)    I/Os:   I&O's Detail      CAPILLARY BLOOD GLUCOSE          =======================  MEDICATIONS  ===================  MEDICATIONS  (STANDING):  docusate sodium 100 milliGRAM(s) Oral three times a day  famotidine    Tablet 20 milliGRAM(s) Oral two times a day  heparin  Injectable 5000 Unit(s) SubCutaneous every 8 hours  HYDROmorphone PCA (1 mG/mL) 30 milliLiter(s) PCA Continuous PCA Continuous  lactated ringers. 1000 milliLiter(s) (30 mL/Hr) IV Continuous <Continuous>    MEDICATIONS  (PRN):  diphenhydrAMINE   Injectable 25 milliGRAM(s) IV Push every 4 hours PRN Pruritus  HYDROmorphone PCA (1 mG/mL) Rescue Clinician Bolus 0.5 milliGRAM(s) IV Push every 15 minutes PRN for Pain Scale GREATER THAN 6  naloxone Injectable 0.1 milliGRAM(s) IV Push every 3 minutes PRN For ANY of the following changes in patient status:  A. RR LESS THAN 10 breaths per minute, B. Oxygen saturation LESS THAN 90%, C. Sedation score of 6  ondansetron Injectable 4 milliGRAM(s) IV Push every 6 hours PRN Nausea      ======================VENTILATOR SETTINGS  ==============      =================== PATIENT CARE ACCESS DEVICES ==========  Peripheral IV  Central Venous Line	R	L	IJ	Fem	SC			Placed:   Arterial Line	R	L	PT	DP	Fem	Rad	Ax	Placed:   Midline:				  Urinary Catheter, Date Placed:   Necessity of urinary, arterial, and venous catheters discussed    ======================= PHYSICAL EXAM===================  General:                         Comfortable, Awake, alert, not in any distress  Neuro:                            Moving all extremities to commands. No focal deficits	  HEENT:                           SHOLA/ ETT/ NGT/ trach  Respiratory:	Lungs clear on auscultation bilaterally with good aeration.                                           No rales, rhonchi, no wheezing. Effort even and unlabored.  CV:		Regular rate and rhythm. Normal S1/S2. No murmurs  Abdomen:	                     Soft,  nontender, not-distended. Bowel sounds present / absent.   Skin:		No rash.  Extremities:	Warm, no cyanosis or edema.  Palpable pulses    ============================ LABS =======================                      ===================== IMAGING STUDIES ===================  Radiology personally reviewed.    ====================ASSESSMENT AND PLAN ================      ====================== NEUROLOGY=======================  Pain control with PCA / PCEA / Tylenol IV / Toradol / Percocet  Pt is on Precedex for agitation  Pt is sedated with Propofol / Fentanyl    ==================== RESPIRATORY========================  Pt is on            L nasal canula / Face tent____% FiO2  Comfortable, no evidence of distress.  Using incentive spirometry & doing                ml  Monitor chest tube output  Chest tube to suction / water seal	    Mechanical Ventilation:    Mechanical ventilator status assessed & settings reviewed  Continue bronchodilators, pulmonary toilet  Head of bed elevation to 30-40 degrees    ====================CARDIOVASCULAR=====================  Continue hemodynamic monitoring/ telemetry  Not on any pressors  Continue cardiovascular / antihypertensive medications    ===================== RENAL ============================  Continue LR 30CC/hr      D/C IVF  Monitor I/Os, BUN/ Cr  and electrolytes  D/C Deng      Keep Deng for UO monitoring  BPH: Continue Flomax/ Finasteride      ==================== GASTROINTESTINAL===================  On regular diet, tolerating well  Continue GI prophylaxis with Pepcid / Protonix  Continue Zofran / Reglan for nausea - PRN	  NPO    =======================    ENDOCRIN  =====================  Glycemic monitoring  F/S with coverage  ===================HEMATOLOGIC/ONCOLOGIC =============  Monitor chest tube output. No signs of active bleeding.   Follow CBC, coags  in AM  DVT prophylaxis with SCD, sc Heparin    ========================INFECTIOUS DISEASE===============  No signs of infection. Monitor for fever / leukocytosis.  All surgical incision / chest tube  sites look clean  D/C Deng      Pertinent clinical, laboratory, radiographic, hemodynamic, echocardiographic, respiratory data, microbiologic data and chart were reviewed and analyzed frequently throughout the course of the day and night. GI and DVT prophylaxis, glycemic control, head of bed elevation and skin care issues were addressed.  Patient seen, examined and plan discussed with CT Surgery / CTICU team during rounds.  Pt remains critically ill in imminent risk of  deterioration and requires very careful cardio- pulmonary monitoring and support.    I have spent               minutes of critical care time with this pt between            am/pm    and               am/ pm         minutes spent on total encounter; more than 50% of the visit was spent counseling and/or coordinating care by the attending physician.        LESLEY Ryan MD NIECY GOODWIN          MRN-[id number]    HPI:  Pt is a 52 yr old male scheduled for Flexible Bronchoscopy Right VATS Robotic Assisted Thymectomy with Dr Xiao 10/15/18. 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  Active  RADHA.       Operative Findings:  · Operative Findings	Thymic cyst	    Procedure:  POD # :     Issues:        Interval/Overnight Events/ ROS  Pt remained hemodynamically stable overnight, not on any pressors or inotropes. OOB to chair, breathing comfortably with minimal pain. Ambulated several times . Denies pain, no SOB, no palpitations, no nausea/ no vomiting, no dizziness  A-line and deng d/mague         PAST MEDICAL & SURGICAL HISTORY:  Lung mass  H/O colonoscopy    Allergies    No Known Allergies    Intolerances            ***VITAL SIGNS:  Vital Signs Last 24 Hrs  T(C): 36.7 (15 Oct 2018 15:13), Max: 36.9 (15 Oct 2018 08:03)  T(F): 98 (15 Oct 2018 15:13), Max: 98.4 (15 Oct 2018 08:03)  HR: 75 (15 Oct 2018 15:13) (65 - 75)  BP: 114/73 (15 Oct 2018 15:13) (114/73 - 117/75)  BP(mean): 82 (15 Oct 2018 15:13) (82 - 82)  RR: 7 (15 Oct 2018 15:13) (7 - 16)  SpO2: 98% (15 Oct 2018 15:13) (96% - 98%)    I/Os:   I&O's Detail      CAPILLARY BLOOD GLUCOSE          =======================  MEDICATIONS  ===================  MEDICATIONS  (STANDING):  docusate sodium 100 milliGRAM(s) Oral three times a day  famotidine    Tablet 20 milliGRAM(s) Oral two times a day  heparin  Injectable 5000 Unit(s) SubCutaneous every 8 hours  HYDROmorphone PCA (1 mG/mL) 30 milliLiter(s) PCA Continuous PCA Continuous  lactated ringers. 1000 milliLiter(s) (30 mL/Hr) IV Continuous <Continuous>    MEDICATIONS  (PRN):  diphenhydrAMINE   Injectable 25 milliGRAM(s) IV Push every 4 hours PRN Pruritus  HYDROmorphone PCA (1 mG/mL) Rescue Clinician Bolus 0.5 milliGRAM(s) IV Push every 15 minutes PRN for Pain Scale GREATER THAN 6  naloxone Injectable 0.1 milliGRAM(s) IV Push every 3 minutes PRN For ANY of the following changes in patient status:  A. RR LESS THAN 10 breaths per minute, B. Oxygen saturation LESS THAN 90%, C. Sedation score of 6  ondansetron Injectable 4 milliGRAM(s) IV Push every 6 hours PRN Nausea      ======================VENTILATOR SETTINGS  ==============      =================== PATIENT CARE ACCESS DEVICES ==========  Peripheral IV  Central Venous Line	R	L	IJ	Fem	SC			Placed:   Arterial Line	R	L	PT	DP	Fem	Rad	Ax	Placed:   Midline:				  Urinary Catheter, Date Placed:   Necessity of urinary, arterial, and venous catheters discussed    ======================= PHYSICAL EXAM===================  General:                         Comfortable, Awake, alert, not in any distress  Neuro:                            Moving all extremities to commands. No focal deficits	  HEENT:                           SHOLA/ ETT/ NGT/ trach  Respiratory:	Lungs clear on auscultation bilaterally with good aeration.                                           No rales, rhonchi, no wheezing. Effort even and unlabored.  CV:		Regular rate and rhythm. Normal S1/S2. No murmurs  Abdomen:	                     Soft,  nontender, not-distended. Bowel sounds present / absent.   Skin:		No rash.  Extremities:	Warm, no cyanosis or edema.  Palpable pulses    ============================ LABS =======================                      ===================== IMAGING STUDIES ===================  Radiology personally reviewed.    ====================ASSESSMENT AND PLAN ================      ====================== NEUROLOGY=======================  Pain control with PCA / PCEA / Tylenol IV / Toradol / Percocet  Pt is on Precedex for agitation  Pt is sedated with Propofol / Fentanyl    ==================== RESPIRATORY========================  Pt is on            L nasal canula / Face tent____% FiO2  Comfortable, no evidence of distress.  Using incentive spirometry & doing                ml  Monitor chest tube output  Chest tube to suction / water seal	    Mechanical Ventilation:    Mechanical ventilator status assessed & settings reviewed  Continue bronchodilators, pulmonary toilet  Head of bed elevation to 30-40 degrees    ====================CARDIOVASCULAR=====================  Continue hemodynamic monitoring/ telemetry  Not on any pressors  Continue cardiovascular / antihypertensive medications    ===================== RENAL ============================  Continue LR 30CC/hr      D/C IVF  Monitor I/Os, BUN/ Cr  and electrolytes  D/C Deng      Keep Deng for UO monitoring  BPH: Continue Flomax/ Finasteride      ==================== GASTROINTESTINAL===================  On regular diet, tolerating well  Continue GI prophylaxis with Pepcid / Protonix  Continue Zofran / Reglan for nausea - PRN	  NPO    =======================    ENDOCRIN  =====================  Glycemic monitoring  F/S with coverage  ===================HEMATOLOGIC/ONCOLOGIC =============  Monitor chest tube output. No signs of active bleeding.   Follow CBC, coags  in AM  DVT prophylaxis with SCD, sc Heparin    ========================INFECTIOUS DISEASE===============  No signs of infection. Monitor for fever / leukocytosis.  All surgical incision / chest tube  sites look clean  D/C Deng      Pertinent clinical, laboratory, radiographic, hemodynamic, echocardiographic, respiratory data, microbiologic data and chart were reviewed and analyzed frequently throughout the course of the day and night. GI and DVT prophylaxis, glycemic control, head of bed elevation and skin care issues were addressed.  Patient seen, examined and plan discussed with CT Surgery / CTICU team during rounds.  Pt remains critically ill in imminent risk of  deterioration and requires very careful cardio- pulmonary monitoring and support.    I have spent               minutes of critical care time with this pt between            am/pm    and               am/ pm         minutes spent on total encounter; more than 50% of the visit was spent counseling and/or coordinating care by the attending physician.        LESLEY Ryan MD NIECY GOODWIN          MRN-[id number]    HPI:  Pt is a 52 yr old male scheduled for Flexible Bronchoscopy Right VATS Robotic Assisted Thymectomy with Dr Xiao 10/15/18. 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  Active  RADHA.       Operative Findings:  · Operative Findings	Thymic cyst	    Procedure:  POD # :     Issues:        Interval/Overnight Events/ ROS  Pt remained hemodynamically stable overnight, not on any pressors or inotropes. OOB to chair, breathing comfortably with minimal pain. Ambulated several times . Denies pain, no SOB, no palpitations, no nausea/ no vomiting, no dizziness  A-line and deng d/mague         PAST MEDICAL & SURGICAL HISTORY:  Lung mass  H/O colonoscopy    Home Medications:   * No Current Medications as of 03-Oct-2018 16:57 documented in Structured   Allergies    No Known Allergies    Intolerances            ***VITAL SIGNS:  Vital Signs Last 24 Hrs  T(C): 36.7 (15 Oct 2018 15:13), Max: 36.9 (15 Oct 2018 08:03)  T(F): 98 (15 Oct 2018 15:13), Max: 98.4 (15 Oct 2018 08:03)  HR: 75 (15 Oct 2018 15:13) (65 - 75)  BP: 114/73 (15 Oct 2018 15:13) (114/73 - 117/75)  BP(mean): 82 (15 Oct 2018 15:13) (82 - 82)  RR: 7 (15 Oct 2018 15:13) (7 - 16)  SpO2: 98% (15 Oct 2018 15:13) (96% - 98%)    I/Os:   I&O's Detail      CAPILLARY BLOOD GLUCOSE          =======================  MEDICATIONS  ===================  MEDICATIONS  (STANDING):  docusate sodium 100 milliGRAM(s) Oral three times a day  famotidine    Tablet 20 milliGRAM(s) Oral two times a day  heparin  Injectable 5000 Unit(s) SubCutaneous every 8 hours  HYDROmorphone PCA (1 mG/mL) 30 milliLiter(s) PCA Continuous PCA Continuous  lactated ringers. 1000 milliLiter(s) (30 mL/Hr) IV Continuous <Continuous>    MEDICATIONS  (PRN):  diphenhydrAMINE   Injectable 25 milliGRAM(s) IV Push every 4 hours PRN Pruritus  HYDROmorphone PCA (1 mG/mL) Rescue Clinician Bolus 0.5 milliGRAM(s) IV Push every 15 minutes PRN for Pain Scale GREATER THAN 6  naloxone Injectable 0.1 milliGRAM(s) IV Push every 3 minutes PRN For ANY of the following changes in patient status:  A. RR LESS THAN 10 breaths per minute, B. Oxygen saturation LESS THAN 90%, C. Sedation score of 6  ondansetron Injectable 4 milliGRAM(s) IV Push every 6 hours PRN Nausea      ======================VENTILATOR SETTINGS  ==============      =================== PATIENT CARE ACCESS DEVICES ==========  Peripheral IV  Central Venous Line	R	L	IJ	Fem	SC			Placed:   Arterial Line	R	L	PT	DP	Fem	Rad	Ax	Placed:   Midline:				  Urinary Catheter, Date Placed:   Necessity of urinary, arterial, and venous catheters discussed    ======================= PHYSICAL EXAM===================  General:                         Comfortable, Awake, alert, not in any distress  Neuro:                            Moving all extremities to commands. No focal deficits	  HEENT:                           SHOLA/ ETT/ NGT/ trach  Respiratory:	Lungs clear on auscultation bilaterally with good aeration.                                           No rales, rhonchi, no wheezing. Effort even and unlabored.  CV:		Regular rate and rhythm. Normal S1/S2. No murmurs  Abdomen:	                     Soft,  nontender, not-distended. Bowel sounds present / absent.   Skin:		No rash.  Extremities:	Warm, no cyanosis or edema.  Palpable pulses    ============================ LABS =======================                      ===================== IMAGING STUDIES ===================  Radiology personally reviewed.    ====================ASSESSMENT AND PLAN ================      ====================== NEUROLOGY=======================  Pain control with PCA / PCEA / Tylenol IV / Toradol / Percocet  Pt is on Precedex for agitation  Pt is sedated with Propofol / Fentanyl    ==================== RESPIRATORY========================  Pt is on            L nasal canula / Face tent____% FiO2  Comfortable, no evidence of distress.  Using incentive spirometry & doing                ml  Monitor chest tube output  Chest tube to suction / water seal	    Mechanical Ventilation:    Mechanical ventilator status assessed & settings reviewed  Continue bronchodilators, pulmonary toilet  Head of bed elevation to 30-40 degrees    ====================CARDIOVASCULAR=====================  Continue hemodynamic monitoring/ telemetry  Not on any pressors  Continue cardiovascular / antihypertensive medications    ===================== RENAL ============================  Continue LR 30CC/hr      D/C IVF  Monitor I/Os, BUN/ Cr  and electrolytes  D/C Deng      Keep Deng for UO monitoring  BPH: Continue Flomax/ Finasteride      ==================== GASTROINTESTINAL===================  On regular diet, tolerating well  Continue GI prophylaxis with Pepcid / Protonix  Continue Zofran / Reglan for nausea - PRN	  NPO    =======================    ENDOCRIN  =====================  Glycemic monitoring  F/S with coverage  ===================HEMATOLOGIC/ONCOLOGIC =============  Monitor chest tube output. No signs of active bleeding.   Follow CBC, coags  in AM  DVT prophylaxis with SCD, sc Heparin    ========================INFECTIOUS DISEASE===============  No signs of infection. Monitor for fever / leukocytosis.  All surgical incision / chest tube  sites look clean  D/C Deng      Pertinent clinical, laboratory, radiographic, hemodynamic, echocardiographic, respiratory data, microbiologic data and chart were reviewed and analyzed frequently throughout the course of the day and night. GI and DVT prophylaxis, glycemic control, head of bed elevation and skin care issues were addressed.  Patient seen, examined and plan discussed with CT Surgery / CTICU team during rounds.  Pt remains critically ill in imminent risk of  deterioration and requires very careful cardio- pulmonary monitoring and support.    I have spent               minutes of critical care time with this pt between            am/pm    and               am/ pm         minutes spent on total encounter; more than 50% of the visit was spent counseling and/or coordinating care by the attending physician.        LESLEY Ryan MD NIECY GOODWIN          MRN-[id number]    HPI:  Pt is a 52 yr old male scheduled for Flexible Bronchoscopy Right VATS Robotic Assisted Thymectomy with Dr Xiao 10/15/18. 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        · Operative Findings	Thymic cyst	    POD # : 0    Issues:  s/p thymectomy             postop pain             right  chest tube x 2 in place    Interval/OR Events/ ROS  Pt extubated in the OR . On arrival in ICU appears comfortable, no SOB, no palpitations, no nausea. C/o mild pain at tight chest tube site- improved with PCA    PAST MEDICAL & SURGICAL HISTORY:  Lung mass  H/O colonoscopy    Home Medications:   * No Current Medications as of 03-Oct-2018 16:57 documented in Structured     Allergies  No Known Allergies      ICU Vital Signs Last 24 Hrs  T(C): 36.7 (15 Oct 2018 15:13), Max: 36.9 (15 Oct 2018 08:03)  T(F): 98 (15 Oct 2018 15:13), Max: 98.4 (15 Oct 2018 08:03)  HR: 76 (15 Oct 2018 17:00) (65 - 86)  BP: 106/67 (15 Oct 2018 17:00) (106/67 - 117/75)  BP(mean): 75 (15 Oct 2018 17:00) (75 - 83)  ABP: --  ABP(mean): --  RR: 25 (15 Oct 2018 17:00) (7 - 25)  SpO2: 96% (15 Oct 2018 17:00) (96% - 99%)      I&O's Detail    15 Oct 2018 07:01  -  15 Oct 2018 18:01  --------------------------------------------------------  IN:    lactated ringers.: 120 mL  Total IN: 120 mL    OUT:    Chest Tube: 15 mL    Chest Tube: 13 mL  Total OUT: 28 mL    Total NET: 92 mL  =======================  MEDICATIONS  ===================  MEDICATIONS  (STANDING):  docusate sodium 100 milliGRAM(s) Oral three times a day  famotidine    Tablet 20 milliGRAM(s) Oral two times a day  heparin  Injectable 5000 Unit(s) SubCutaneous every 8 hours  HYDROmorphone PCA (1 mG/mL) 30 milliLiter(s) PCA Continuous PCA Continuous  lactated ringers. 1000 milliLiter(s) (30 mL/Hr) IV Continuous <Continuous>    MEDICATIONS  (PRN):  diphenhydrAMINE   Injectable 25 milliGRAM(s) IV Push every 4 hours PRN Pruritus  HYDROmorphone PCA (1 mG/mL) Rescue Clinician Bolus 0.5 milliGRAM(s) IV Push every 15 minutes PRN for Pain Scale GREATER THAN 6  naloxone Injectable 0.1 milliGRAM(s) IV Push every 3 minutes PRN For ANY of the following changes in patient status:  A. RR LESS THAN 10 breaths per minute, B. Oxygen saturation LESS THAN 90%, C. Sedation score of 6  ondansetron Injectable 4 milliGRAM(s) IV Push every 6 hours PRN Nausea    =================== PATIENT CARE ACCESS DEVICES ==========  Peripheral IV (+)    ======================= PHYSICAL EXAM===================  General:           Comfortable, Awake, alert, not in any distress  Neuro:             Moving all extremities to commands. No focal deficits	  HEENT:                           SHOLA  Respiratory:	Lungs clear on auscultation bilaterally with good aeration.                           No rales, rhonchi, no wheezing. Effort even and unlabored.                          RIght chest tube site - clean, (+) air leak from anterior chest tube  CV:		Regular rate and rhythm. Normal S1/S2. No murmurs  Abdomen:         Soft,  nontender, not-distended. Bowel sounds present   Skin:		No rash.  Extremities:	Warm, no cyanosis or edema.  Palpable pulses    ============================ LABS =======================                        15.3   12.53 )-----------( 231      ( 15 Oct 2018 16:05 )             51.7     10-15    139  |  102  |  10  ----------------------------<  125<H>  4.1   |  25  |  1.12    Ca    9.0      15 Oct 2018 16:05  Phos  2.4     10-15  Mg     1.8     10-15    TPro  6.9  /  Alb  3.8  /  TBili  0.3  /  DBili  x   /  AST  74<H>  /  ALT  102<H>  /  AlkPhos  66  10-15    PT/INR - ( 15 Oct 2018 16:05 )   PT: 11.4 SEC;   INR: 0.99    PTT:29.2 SEC    ===================== IMAGING STUDIES ===================  Radiology personally reviewed.    < from: Xray Chest 1 View AP/PA (10.15.18 @ 15:43) >    CLINICAL INFORMATION: Status post right VATS with thymectomy.  COMPARISON:  CT scan of the chest from April 6, 2018.  INTERPRETATION:     Heart size and the mediastinum cannot be accurately evaluated on this   projection.  A mediastinal drain is present. There is a right chest tube with tip   overlying the mid to lower right hemithorax.  There are low lung volumes.  There is a small pneumothorax in the inferior lateral right chest.  There is right basilar opacity which may be secondary to subsegmental   atelectasis. There is linear atelectasis at the left base.  No pleural effusion is seen.  The visualized bony structures appear intact.    IMPRESSION:  Right chest tube and mediastinal drain.  Small pneumothorax in the inferior lateral right chest.  Right basilar opacity which may be secondary to subsegmental atelectasis.    < end of copied text >    ====================ASSESSMENT AND PLAN ================  Pt is a 52 yr old male scheduled for Flexible Bronchoscopy Right VATS Robotic Assisted Thymectomy with Dr Xiao 10/15/18. 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        · Operative Findings	Thymic cyst	      Issues:  s/p thymectomy             postop pain             right  chest tube x 2 in place             right pneumothorax/ air leak    ====================== NEUROLOGY=======================  Pain control with PCA /  Tylenol IV   OOB  in PM  ==================== RESPIRATORY========================  Pt is on    2    L nasal canula    Comfortable, no evidence of distress.  Incentive spirometry   Monitor chest tube output and air leak  Chest tube to suction  	   bronchodilators, pulmonary toilet  Head of bed elevation to 30-40 degrees    ====================CARDIOVASCULAR=====================  Continue hemodynamic monitoring/ telemetry    ===================== RENAL ============================  Continue LR 30CC/hr        Monitor I/Os, BUN/ Cr  and electrolytes  No  Membreno  - voiding trial in progress         ==================== GASTROINTESTINAL===================  NPO, clears in PM if stable and can advance as tolerated  Continue GI prophylaxis with Pepcid   Continue Zofran   for nausea - PRN	    =======================    ENDOCRIN  =====================  Glycemic monitoring  F/S with coverage  ===================HEMATOLOGIC/ONCOLOGIC =============  Monitor chest tube output. No signs of active bleeding.   Follow CBC, coags  in AM  DVT prophylaxis with SCD, sc Heparin    ========================INFECTIOUS DISEASE===============  No signs of infection. Monitor for fever / leukocytosis.  All surgical incision / chest tube  sites look clean        Pertinent clinical, laboratory, radiographic, hemodynamic, echocardiographic, respiratory data, microbiologic data and chart were reviewed and analyzed frequently throughout the course of the day and night. GI and DVT prophylaxis, glycemic control, head of bed elevation and skin care issues were addressed.  Patient seen, examined and plan discussed with CT Surgery / CTICU team during rounds.  Pt remains critically ill in imminent risk of  deterioration and requires very careful cardio- pulmonary monitoring and support.    I have spent    35  minutes of critical care time with this pt between  3 pm    and 11:55 pm         minutes spent on total encounter; more than 50% of the visit was spent counseling and/or coordinating care by the attending physician.        LESLEY Ryan MD NIECY GOODWIN          MRN-[id number]    HPI:  Pt is a 52 yr old male scheduled for Flexible Bronchoscopy Right VATS Robotic Assisted Thymectomy with Dr Xiao 10/15/18. 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        · Operative Findings	Thymic cyst	    POD # : 0    Issues:  s/p thymectomy             postop pain             right  chest tube x 2 in place    Interval/OR Events/ ROS  Pt extubated in the OR . On arrival in ICU appears comfortable, no SOB, no palpitations, no nausea. C/o mild pain at tight chest tube site- improved with PCA    PAST MEDICAL & SURGICAL HISTORY:  Lung mass  H/O colonoscopy    Home Medications:   * No Current Medications as of 03-Oct-2018 16:57 documented in Structured     Allergies  No Known Allergies      ICU Vital Signs Last 24 Hrs  T(C): 36.7 (15 Oct 2018 15:13), Max: 36.9 (15 Oct 2018 08:03)  T(F): 98 (15 Oct 2018 15:13), Max: 98.4 (15 Oct 2018 08:03)  HR: 76 (15 Oct 2018 17:00) (65 - 86)  BP: 106/67 (15 Oct 2018 17:00) (106/67 - 117/75)  BP(mean): 75 (15 Oct 2018 17:00) (75 - 83)  ABP: --  ABP(mean): --  RR: 25 (15 Oct 2018 17:00) (7 - 25)  SpO2: 96% (15 Oct 2018 17:00) (96% - 99%)      I&O's Detail    15 Oct 2018 07:01  -  15 Oct 2018 18:01  --------------------------------------------------------  IN:    lactated ringers.: 120 mL  Total IN: 120 mL    OUT:    Chest Tube: 15 mL    Chest Tube: 13 mL  Total OUT: 28 mL    Total NET: 92 mL  =======================  MEDICATIONS  ===================  MEDICATIONS  (STANDING):  docusate sodium 100 milliGRAM(s) Oral three times a day  famotidine    Tablet 20 milliGRAM(s) Oral two times a day  heparin  Injectable 5000 Unit(s) SubCutaneous every 8 hours  HYDROmorphone PCA (1 mG/mL) 30 milliLiter(s) PCA Continuous PCA Continuous  lactated ringers. 1000 milliLiter(s) (30 mL/Hr) IV Continuous <Continuous>    MEDICATIONS  (PRN):  diphenhydrAMINE   Injectable 25 milliGRAM(s) IV Push every 4 hours PRN Pruritus  HYDROmorphone PCA (1 mG/mL) Rescue Clinician Bolus 0.5 milliGRAM(s) IV Push every 15 minutes PRN for Pain Scale GREATER THAN 6  naloxone Injectable 0.1 milliGRAM(s) IV Push every 3 minutes PRN For ANY of the following changes in patient status:  A. RR LESS THAN 10 breaths per minute, B. Oxygen saturation LESS THAN 90%, C. Sedation score of 6  ondansetron Injectable 4 milliGRAM(s) IV Push every 6 hours PRN Nausea    =================== PATIENT CARE ACCESS DEVICES ==========  Peripheral IV (+)    ======================= PHYSICAL EXAM===================  General:           Comfortable, Awake, alert, not in any distress  Neuro:             Moving all extremities to commands. No focal deficits	  HEENT:                           SHOLA  Respiratory:	Lungs clear on auscultation bilaterally with good aeration.                           No rales, rhonchi, no wheezing. Effort even and unlabored.                          RIght chest tube site - clean, (+) air leak from anterior chest tube  CV:		Regular rate and rhythm. Normal S1/S2. No murmurs  Abdomen:         Soft,  nontender, not-distended. Bowel sounds present   Skin:		No rash.  Extremities:	Warm, no cyanosis or edema.  Palpable pulses    ============================ LABS =======================                        15.3   12.53 )-----------( 231      ( 15 Oct 2018 16:05 )             51.7     10-15    139  |  102  |  10  ----------------------------<  125<H>  4.1   |  25  |  1.12    Ca    9.0      15 Oct 2018 16:05  Phos  2.4     10-15  Mg     1.8     10-15    TPro  6.9  /  Alb  3.8  /  TBili  0.3  /  DBili  x   /  AST  74<H>  /  ALT  102<H>  /  AlkPhos  66  10-15    PT/INR - ( 15 Oct 2018 16:05 )   PT: 11.4 SEC;   INR: 0.99    PTT:29.2 SEC    ===================== IMAGING STUDIES ===================  Radiology personally reviewed.    < from: Xray Chest 1 View AP/PA (10.15.18 @ 15:43) >    CLINICAL INFORMATION: Status post right VATS with thymectomy.  COMPARISON:  CT scan of the chest from April 6, 2018.  INTERPRETATION:     Heart size and the mediastinum cannot be accurately evaluated on this   projection.  A mediastinal drain is present. There is a right chest tube with tip   overlying the mid to lower right hemithorax.  There are low lung volumes.  There is a small pneumothorax in the inferior lateral right chest.  There is right basilar opacity which may be secondary to subsegmental   atelectasis. There is linear atelectasis at the left base.  No pleural effusion is seen.  The visualized bony structures appear intact.    IMPRESSION:  Right chest tube and mediastinal drain.  Small pneumothorax in the inferior lateral right chest.  Right basilar opacity which may be secondary to subsegmental atelectasis.    < end of copied text >    ====================ASSESSMENT AND PLAN ================  Pt is a 52 yr old male scheduled for Flexible Bronchoscopy Right VATS Robotic Assisted Thymectomy with Dr Xiao 10/15/18. 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        · Operative Findings	Thymic cyst	      Issues:  s/p thymectomy             postop pain             right  chest tube x 2 in place             right pneumothorax/ air leak             hypomagnesemia / hypophosphatemia- supplemented    ====================== NEUROLOGY=======================  Pain control with PCA /  Tylenol IV   OOB  in PM  ==================== RESPIRATORY========================  Pt is on    2    L nasal canula    Comfortable, no evidence of distress.  Incentive spirometry   Monitor chest tube output and air leak  Chest tube to suction  	   bronchodilators, pulmonary toilet  Head of bed elevation to 30-40 degrees    ====================CARDIOVASCULAR=====================  Continue hemodynamic monitoring/ telemetry    ===================== RENAL ============================  Continue LR 30CC/hr        Monitor I/Os, BUN/ Cr  and electrolytes  No  Membreno  - voiding trial in progress         ==================== GASTROINTESTINAL===================  NPO, clears in PM if stable and can advance as tolerated  Continue GI prophylaxis with Pepcid   Continue Zofran   for nausea - PRN	    =======================    ENDOCRIN  =====================  Glycemic monitoring  F/S with coverage  ===================HEMATOLOGIC/ONCOLOGIC =============  Monitor chest tube output. No signs of active bleeding.   Follow CBC, coags  in AM  DVT prophylaxis with SCD, sc Heparin    ========================INFECTIOUS DISEASE===============  No signs of infection. Monitor for fever / leukocytosis.  All surgical incision / chest tube  sites look clean        Pertinent clinical, laboratory, radiographic, hemodynamic, echocardiographic, respiratory data, microbiologic data and chart were reviewed and analyzed frequently throughout the course of the day and night. GI and DVT prophylaxis, glycemic control, head of bed elevation and skin care issues were addressed.  Patient seen, examined and plan discussed with CT Surgery / CTICU team during rounds.  Pt remains critically ill in imminent risk of  deterioration and requires very careful cardio- pulmonary monitoring and support.    I have spent    35  minutes of critical care time with this pt between  3 pm    and 11:55 pm         minutes spent on total encounter; more than 50% of the visit was spent counseling and/or coordinating care by the attending physician.        LESLEY Ryan MD

## 2018-10-15 NOTE — BRIEF OPERATIVE NOTE - PROCEDURE
<<-----Click on this checkbox to enter Procedure Robotic assisted procedure  10/15/2018  Robotic RVATS, extensive pneumonolysis, thymectomy.  Active  [facility]

## 2018-10-16 LAB
ALBUMIN SERPL ELPH-MCNC: 4 G/DL — SIGNIFICANT CHANGE UP (ref 3.3–5)
ALP SERPL-CCNC: 62 U/L — SIGNIFICANT CHANGE UP (ref 40–120)
ALT FLD-CCNC: 92 U/L — HIGH (ref 4–41)
APTT BLD: 28.6 SEC — SIGNIFICANT CHANGE UP (ref 27.5–37.4)
AST SERPL-CCNC: 67 U/L — HIGH (ref 4–40)
BILIRUB SERPL-MCNC: 0.4 MG/DL — SIGNIFICANT CHANGE UP (ref 0.2–1.2)
BUN SERPL-MCNC: 10 MG/DL — SIGNIFICANT CHANGE UP (ref 7–23)
BUN SERPL-MCNC: 11 MG/DL — SIGNIFICANT CHANGE UP (ref 7–23)
CALCIUM SERPL-MCNC: 8.8 MG/DL — SIGNIFICANT CHANGE UP (ref 8.4–10.5)
CALCIUM SERPL-MCNC: 9.2 MG/DL — SIGNIFICANT CHANGE UP (ref 8.4–10.5)
CHLORIDE SERPL-SCNC: 100 MMOL/L — SIGNIFICANT CHANGE UP (ref 98–107)
CHLORIDE SERPL-SCNC: 98 MMOL/L — SIGNIFICANT CHANGE UP (ref 98–107)
CO2 SERPL-SCNC: 22 MMOL/L — SIGNIFICANT CHANGE UP (ref 22–31)
CO2 SERPL-SCNC: 25 MMOL/L — SIGNIFICANT CHANGE UP (ref 22–31)
CREAT SERPL-MCNC: 0.98 MG/DL — SIGNIFICANT CHANGE UP (ref 0.5–1.3)
CREAT SERPL-MCNC: 0.99 MG/DL — SIGNIFICANT CHANGE UP (ref 0.5–1.3)
GLUCOSE SERPL-MCNC: 123 MG/DL — HIGH (ref 70–99)
GLUCOSE SERPL-MCNC: 145 MG/DL — HIGH (ref 70–99)
HCT VFR BLD CALC: 50.5 % — HIGH (ref 39–50)
HGB BLD-MCNC: 14.8 G/DL — SIGNIFICANT CHANGE UP (ref 13–17)
INR BLD: 1.02 — SIGNIFICANT CHANGE UP (ref 0.88–1.17)
MAGNESIUM SERPL-MCNC: 2.1 MG/DL — SIGNIFICANT CHANGE UP (ref 1.6–2.6)
MAGNESIUM SERPL-MCNC: 2.2 MG/DL — SIGNIFICANT CHANGE UP (ref 1.6–2.6)
MCHC RBC-ENTMCNC: 21.4 PG — LOW (ref 27–34)
MCHC RBC-ENTMCNC: 29.3 % — LOW (ref 32–36)
MCV RBC AUTO: 72.9 FL — LOW (ref 80–100)
NRBC # FLD: 0 — SIGNIFICANT CHANGE UP
PHOSPHATE SERPL-MCNC: 3.2 MG/DL — SIGNIFICANT CHANGE UP (ref 2.5–4.5)
PHOSPHATE SERPL-MCNC: 4.1 MG/DL — SIGNIFICANT CHANGE UP (ref 2.5–4.5)
PLATELET # BLD AUTO: 247 K/UL — SIGNIFICANT CHANGE UP (ref 150–400)
PMV BLD: 9.6 FL — SIGNIFICANT CHANGE UP (ref 7–13)
POTASSIUM SERPL-MCNC: 4.6 MMOL/L — SIGNIFICANT CHANGE UP (ref 3.5–5.3)
POTASSIUM SERPL-MCNC: 4.7 MMOL/L — SIGNIFICANT CHANGE UP (ref 3.5–5.3)
POTASSIUM SERPL-SCNC: 4.6 MMOL/L — SIGNIFICANT CHANGE UP (ref 3.5–5.3)
POTASSIUM SERPL-SCNC: 4.7 MMOL/L — SIGNIFICANT CHANGE UP (ref 3.5–5.3)
PROT SERPL-MCNC: 7.2 G/DL — SIGNIFICANT CHANGE UP (ref 6–8.3)
PROTHROM AB SERPL-ACNC: 11.7 SEC — SIGNIFICANT CHANGE UP (ref 9.8–13.1)
RBC # BLD: 6.93 M/UL — HIGH (ref 4.2–5.8)
RBC # FLD: 17.6 % — HIGH (ref 10.3–14.5)
SODIUM SERPL-SCNC: 136 MMOL/L — SIGNIFICANT CHANGE UP (ref 135–145)
SODIUM SERPL-SCNC: 138 MMOL/L — SIGNIFICANT CHANGE UP (ref 135–145)
WBC # BLD: 12.48 K/UL — HIGH (ref 3.8–10.5)
WBC # FLD AUTO: 12.48 K/UL — HIGH (ref 3.8–10.5)

## 2018-10-16 PROCEDURE — 71045 X-RAY EXAM CHEST 1 VIEW: CPT | Mod: 26

## 2018-10-16 PROCEDURE — 99233 SBSQ HOSP IP/OBS HIGH 50: CPT

## 2018-10-16 RX ORDER — GABAPENTIN 400 MG/1
200 CAPSULE ORAL EVERY 8 HOURS
Qty: 0 | Refills: 0 | Status: COMPLETED | OUTPATIENT
Start: 2018-10-16 | End: 2018-10-18

## 2018-10-16 RX ADMIN — Medication 100 MILLIGRAM(S): at 22:39

## 2018-10-16 RX ADMIN — HEPARIN SODIUM 5000 UNIT(S): 5000 INJECTION INTRAVENOUS; SUBCUTANEOUS at 22:39

## 2018-10-16 RX ADMIN — Medication 100 MILLIGRAM(S): at 05:54

## 2018-10-16 RX ADMIN — FAMOTIDINE 20 MILLIGRAM(S): 10 INJECTION INTRAVENOUS at 05:54

## 2018-10-16 RX ADMIN — SODIUM CHLORIDE 30 MILLILITER(S): 9 INJECTION, SOLUTION INTRAVENOUS at 07:53

## 2018-10-16 RX ADMIN — FAMOTIDINE 20 MILLIGRAM(S): 10 INJECTION INTRAVENOUS at 18:13

## 2018-10-16 RX ADMIN — Medication 1 PACKET(S): at 02:12

## 2018-10-16 RX ADMIN — HYDROMORPHONE HYDROCHLORIDE 30 MILLILITER(S): 2 INJECTION INTRAMUSCULAR; INTRAVENOUS; SUBCUTANEOUS at 07:25

## 2018-10-16 RX ADMIN — HEPARIN SODIUM 5000 UNIT(S): 5000 INJECTION INTRAVENOUS; SUBCUTANEOUS at 05:54

## 2018-10-16 RX ADMIN — Medication 1 PACKET(S): at 11:34

## 2018-10-16 RX ADMIN — Medication 1 PACKET(S): at 05:54

## 2018-10-16 RX ADMIN — GABAPENTIN 200 MILLIGRAM(S): 400 CAPSULE ORAL at 22:39

## 2018-10-16 RX ADMIN — Medication 100 MILLIGRAM(S): at 13:02

## 2018-10-16 RX ADMIN — HEPARIN SODIUM 5000 UNIT(S): 5000 INJECTION INTRAVENOUS; SUBCUTANEOUS at 13:02

## 2018-10-16 RX ADMIN — HYDROMORPHONE HYDROCHLORIDE 30 MILLILITER(S): 2 INJECTION INTRAMUSCULAR; INTRAVENOUS; SUBCUTANEOUS at 21:33

## 2018-10-16 RX ADMIN — GABAPENTIN 200 MILLIGRAM(S): 400 CAPSULE ORAL at 13:01

## 2018-10-16 NOTE — PROGRESS NOTE ADULT - SUBJECTIVE AND OBJECTIVE BOX
NIECY GOODWIN          MRN-[id number]    HPI:  Pt is a 52 yr old male scheduled for Flexible Bronchoscopy Right VATS Robotic Assisted Thymectomy with Dr Xiao 10/15/18. Pt found to have lung mass as incidental finding with w/u for chest pain 4/18 - Pt now denies SOB or chest pain - Pt had BW done at PCP Monday - call and confirmed same - and to be faxed to PST - T&S done (03 Oct 2018 16:52)      Procedure:  POD # :     Issues:        Interval/Overnight Events/ ROS  Pt remained hemodynamically stable overnight, not on any pressors or inotropes. OOB to chair, breathing comfortably with minimal pain. Ambulated several times . Denies pain, no SOB, no palpitations, no nausea/ no vomiting, no dizziness  A-line and deng d/mague         PAST MEDICAL & SURGICAL HISTORY:  Lung mass  H/O colonoscopy    Allergies    No Known Allergies    Intolerances            ***VITAL SIGNS:  Vital Signs Last 24 Hrs  T(C): 36.6 (16 Oct 2018 00:00), Max: 36.9 (15 Oct 2018 08:03)  T(F): 97.8 (16 Oct 2018 00:00), Max: 98.4 (15 Oct 2018 08:03)  HR: 86 (16 Oct 2018 04:00) (65 - 93)  BP: 116/59 (16 Oct 2018 04:00) (106/67 - 141/91)  BP(mean): 71 (16 Oct 2018 04:00) (71 - 102)  RR: 24 (16 Oct 2018 04:00) (7 - 34)  SpO2: 96% (16 Oct 2018 04:00) (94% - 99%)    I/Os:   I&O's Detail    15 Oct 2018 07:01  -  16 Oct 2018 05:41  --------------------------------------------------------  IN:    IV PiggyBack: 100 mL    lactated ringers.: 420 mL  Total IN: 520 mL    OUT:    Chest Tube: 90 mL    Chest Tube: 75 mL    Voided: 1700 mL  Total OUT: 1865 mL    Total NET: -1345 mL          CAPILLARY BLOOD GLUCOSE          =======================  MEDICATIONS  ===================  MEDICATIONS  (STANDING):  docusate sodium 100 milliGRAM(s) Oral three times a day  famotidine    Tablet 20 milliGRAM(s) Oral two times a day  heparin  Injectable 5000 Unit(s) SubCutaneous every 8 hours  HYDROmorphone PCA (1 mG/mL) 30 milliLiter(s) PCA Continuous PCA Continuous  influenza   Vaccine 0.5 milliLiter(s) IntraMuscular once  lactated ringers. 1000 milliLiter(s) (30 mL/Hr) IV Continuous <Continuous>  metoclopramide Injectable 10 milliGRAM(s) IV Push once  potassium phosphate / sodium phosphate powder 1 Packet(s) Oral every 6 hours    MEDICATIONS  (PRN):  diphenhydrAMINE   Injectable 25 milliGRAM(s) IV Push every 4 hours PRN Pruritus  HYDROmorphone PCA (1 mG/mL) Rescue Clinician Bolus 0.5 milliGRAM(s) IV Push every 15 minutes PRN for Pain Scale GREATER THAN 6  naloxone Injectable 0.1 milliGRAM(s) IV Push every 3 minutes PRN For ANY of the following changes in patient status:  A. RR LESS THAN 10 breaths per minute, B. Oxygen saturation LESS THAN 90%, C. Sedation score of 6  ondansetron Injectable 4 milliGRAM(s) IV Push every 6 hours PRN Nausea      ======================VENTILATOR SETTINGS  ==============      =================== PATIENT CARE ACCESS DEVICES ==========  Peripheral IV  Central Venous Line	R	L	IJ	Fem	SC			Placed:   Arterial Line	R	L	PT	DP	Fem	Rad	Ax	Placed:   Midline:				  Urinary Catheter, Date Placed:   Necessity of urinary, arterial, and venous catheters discussed    ======================= PHYSICAL EXAM===================  General:                         Comfortable, Awake, alert, not in any distress  Neuro:                            Moving all extremities to commands. No focal deficits	  HEENT:                           SHOLA/ ETT/ NGT/ trach  Respiratory:	Lungs clear on auscultation bilaterally with good aeration.                                           No rales, rhonchi, no wheezing. Effort even and unlabored.  CV:		Regular rate and rhythm. Normal S1/S2. No murmurs  Abdomen:	                     Soft,  nontender, not-distended. Bowel sounds present / absent.   Skin:		No rash.  Extremities:	Warm, no cyanosis or edema.  Palpable pulses    ============================ LABS =======================                        14.8   12.48 )-----------( 247      ( 16 Oct 2018 00:08 )             50.5     10-16    136  |  98  |  11  ----------------------------<  145<H>  4.6   |  22  |  0.98    Ca    9.2      16 Oct 2018 00:08  Phos  3.2     10-16  Mg     2.1     10-16    TPro  7.2  /  Alb  4.0  /  TBili  0.4  /  DBili  x   /  AST  67<H>  /  ALT  92<H>  /  AlkPhos  62  10-16    LIVER FUNCTIONS - ( 16 Oct 2018 00:08 )  Alb: 4.0 g/dL / Pro: 7.2 g/dL / ALK PHOS: 62 u/L / ALT: 92 u/L / AST: 67 u/L / GGT: x           PT/INR - ( 15 Oct 2018 16:05 )   PT: 11.4 SEC;   INR: 0.99          PTT - ( 15 Oct 2018 16:05 )  PTT:29.2 SEC          ===================== IMAGING STUDIES ===================  Radiology personally reviewed.    ====================ASSESSMENT AND PLAN ================      ====================== NEUROLOGY=======================  Pain control with PCA / PCEA / Tylenol IV / Toradol / Percocet  Pt is on Precedex for agitation  Pt is sedated with Propofol / Fentanyl    ==================== RESPIRATORY========================  Pt is on            L nasal canula / Face tent____% FiO2  Comfortable, no evidence of distress.  Using incentive spirometry & doing                ml  Monitor chest tube output  Chest tube to suction / water seal	    Mechanical Ventilation:    Mechanical ventilator status assessed & settings reviewed  Continue bronchodilators, pulmonary toilet  Head of bed elevation to 30-40 degrees    ====================CARDIOVASCULAR=====================  Continue hemodynamic monitoring/ telemetry  Not on any pressors  Continue cardiovascular / antihypertensive medications    ===================== RENAL ============================  Continue LR 30CC/hr      D/C IVF  Monitor I/Os, BUN/ Cr  and electrolytes  D/C Deng      Keep Deng for UO monitoring  BPH: Continue Flomax/ Finasteride      ==================== GASTROINTESTINAL===================  On regular diet, tolerating well  Continue GI prophylaxis with Pepcid / Protonix  Continue Zofran / Reglan for nausea - PRN	  NPO    =======================    ENDOCRIN  =====================  Glycemic monitoring  F/S with coverage  ===================HEMATOLOGIC/ONCOLOGIC =============  Monitor chest tube output. No signs of active bleeding.   Follow CBC, coags  in AM  DVT prophylaxis with SCD, sc Heparin    ========================INFECTIOUS DISEASE===============  No signs of infection. Monitor for fever / leukocytosis.  All surgical incision / chest tube  sites look clean  D/C Deng      Pertinent clinical, laboratory, radiographic, hemodynamic, echocardiographic, respiratory data, microbiologic data and chart were reviewed and analyzed frequently throughout the course of the day and night. GI and DVT prophylaxis, glycemic control, head of bed elevation and skin care issues were addressed.  Patient seen, examined and plan discussed with CT Surgery / CTICU team during rounds.  Pt remains critically ill in imminent risk of  deterioration and requires very careful cardio- pulmonary monitoring and support.    I have spent               minutes of critical care time with this pt between            am/pm    and               am/ pm         minutes spent on total encounter; more than 50% of the visit was spent counseling and/or coordinating care by the attending physician.        LESLEY Ryan MD NIECY GOODWIN          MRN-[id number]    HPI:  Pt is a 52 yr old male scheduled for Flexible Bronchoscopy Right VATS Robotic Assisted Thymectomy with Dr Xiao 10/15/18. 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        · Operative Findings	Thymic cyst	    POD # : 1    Issues:  s/p thymectomy             postop pain             right  chest tube x 2 in place      Interval/Overnight Events/ ROS  No events overnight, not on any pressors or inotropes. OOB to chair, breathing comfortably with minimal pain. Denies  SOB, no palpitations, no nausea/ no vomiting, no dizziness    PAST MEDICAL & SURGICAL HISTORY:  Lung mass  H/O colonoscopy    Home Medications:   * No Current Medications as of 03-Oct-2018 16:57 documented in Structured     Allergies  No Known Allergies    ***VITAL SIGNS:  Vital Signs Last 24 Hrs  T(C): 36.6 (16 Oct 2018 00:00), Max: 36.9 (15 Oct 2018 08:03)  T(F): 97.8 (16 Oct 2018 00:00), Max: 98.4 (15 Oct 2018 08:03)  HR: 86 (16 Oct 2018 04:00) (65 - 93)  BP: 116/59 (16 Oct 2018 04:00) (106/67 - 141/91)  BP(mean): 71 (16 Oct 2018 04:00) (71 - 102)  RR: 24 (16 Oct 2018 04:00) (7 - 34)  SpO2: 96% (16 Oct 2018 04:00) (94% - 99%)    I/Os:   I&O's Detail    15 Oct 2018 07:01  -  16 Oct 2018 05:41  --------------------------------------------------------  IN:    IV PiggyBack: 100 mL    lactated ringers.: 420 mL  Total IN: 520 mL    OUT:    Chest Tube: 90 mL    Chest Tube: 75 mL    Voided: 1700 mL  Total OUT: 1865 mL    Total NET: -1345 mL      =======================  MEDICATIONS  ===================  MEDICATIONS  (STANDING):  docusate sodium 100 milliGRAM(s) Oral three times a day  famotidine    Tablet 20 milliGRAM(s) Oral two times a day  heparin  Injectable 5000 Unit(s) SubCutaneous every 8 hours  HYDROmorphone PCA (1 mG/mL) 30 milliLiter(s) PCA Continuous PCA Continuous  influenza   Vaccine 0.5 milliLiter(s) IntraMuscular once  lactated ringers. 1000 milliLiter(s) (30 mL/Hr) IV Continuous <Continuous>  metoclopramide Injectable 10 milliGRAM(s) IV Push once  potassium phosphate / sodium phosphate powder 1 Packet(s) Oral every 6 hours    MEDICATIONS  (PRN):  diphenhydrAMINE   Injectable 25 milliGRAM(s) IV Push every 4 hours PRN Pruritus  HYDROmorphone PCA (1 mG/mL) Rescue Clinician Bolus 0.5 milliGRAM(s) IV Push every 15 minutes PRN for Pain Scale GREATER THAN 6  naloxone Injectable 0.1 milliGRAM(s) IV Push every 3 minutes PRN For ANY of the following changes in patient status:  A. RR LESS THAN 10 breaths per minute, B. Oxygen saturation LESS THAN 90%, C. Sedation score of 6  ondansetron Injectable 4 milliGRAM(s) IV Push every 6 hours PRN Nausea      =================== PATIENT CARE ACCESS DEVICES ==========  Peripheral IV (+)    ======================= PHYSICAL EXAM===================  General:           Comfortable, Awake, alert, not in any distress  Neuro:             Moving all extremities to commands. No focal deficits	  HEENT:                           SHOLA  Respiratory:	Lungs clear on auscultation bilaterally with good aeration.                           No rales, rhonchi, no wheezing. Effort even and unlabored.                          RIght chest tube site - clean, (+) air leak from anterior chest tube  CV:		Regular rate and rhythm. Normal S1/S2. No murmurs  Abdomen:         Soft,  nontender, not-distended. Bowel sounds present   Skin:		No rash.  Extremities:	Warm, no cyanosis or edema.  Palpable pulses    ============================ LABS =======================                        14.8   12.48 )-----------( 247      ( 16 Oct 2018 00:08 )             50.5     10-16    136  |  98  |  11  ----------------------------<  145<H>  4.6   |  22  |  0.98    Ca    9.2      16 Oct 2018 00:08  Phos  3.2     10-16  Mg     2.1     10-16                 15.3   12.53 )-----------( 231      ( 15 Oct 2018 16:05 )             51.7     10-15    139  |  102  |  10  ----------------------------<  125<H>  4.1   |  25  |  1.12    Ca    9.0      15 Oct 2018 16:05  Phos  2.4     10-15  Mg     1.8     10-15    TPro  7.2  /  Alb  4.0  /  TBili  0.4  /  DBili  x   /  AST  67<H>  /  ALT  92<H>  /  AlkPhos  62  10-16    LIVER FUNCTIONS - ( 16 Oct 2018 00:08 )  Alb: 4.0 g/dL / Pro: 7.2 g/dL / ALK PHOS: 62 u/L / ALT: 92 u/L / AST: 67 u/L / GGT: x           PT/INR - ( 15 Oct 2018 16:05 )   PT: 11.4 SEC;   INR: 0.99     PTT:29.2 SEC    ===================== IMAGING STUDIES ===================  Radiology personally reviewed.  < from: Xray Chest 1 View AP/PA (10.15.18 @ 15:43) >    CLINICAL INFORMATION: Status post right VATS with thymectomy.  COMPARISON:  CT scan of the chest from April 6, 2018.  INTERPRETATION:     Heart size and the mediastinum cannot be accurately evaluated on this   projection.  A mediastinal drain is present. There is a right chest tube with tip   overlying the mid to lower right hemithorax.  There are low lung volumes.  There is a small pneumothorax in the inferior lateral right chest.  There is right basilar opacity which may be secondary to subsegmental   atelectasis. There is linear atelectasis at the left base.  No pleural effusion is seen.  The visualized bony structures appear intact.    IMPRESSION:  Right chest tube and mediastinal drain.  Small pneumothorax in the inferior lateral right chest.  Right basilar opacity which may be secondary to subsegmental atelectasis.    < end of copied text >      ====================ASSESSMENT AND PLAN ================  Pt is a 52 yr old male scheduled for Flexible Bronchoscopy Right VATS Robotic Assisted Thymectomy with Dr Xiao 10/15/18. 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        · Operative Findings	Thymic cyst	      Issues:  s/p thymectomy             postop pain             right  chest tube x 2 in place             right pneumothorax/ air leak             hypomagnesemia / hypophosphatemia- supplemented    ====================== NEUROLOGY=======================  Pain control with PCA /Tylenol IV   OOB, ambulation    ==================== RESPIRATORY========================  Pt is on    2    L nasal canula    Comfortable, no evidence of distress.  Incentive spirometry   Monitor chest tube output and air leak  Chest tube x2  to suction  	   bronchodilators, pulmonary toilet  Head of bed elevation to 30-40 degrees    ====================CARDIOVASCULAR=====================  Continue hemodynamic monitoring/ telemetry    ===================== RENAL ============================  Continue LR 30CC/hr        Monitor I/Os, BUN/ Cr  and electrolytes  No  Membreno  - voiding ok    ==================== GASTROINTESTINAL===================  Start PO diet  Continue GI prophylaxis with Pepcid    Zofran / Reglan for nausea - PRN	    =======================    ENDOCRIN  =====================  Glycemic monitoring  F/S with coverage  ===================HEMATOLOGIC/ONCOLOGIC =============  Monitor chest tube output. No signs of active bleeding.   Follow CBC, coags  in AM  DVT prophylaxis with SCD, sc Heparin    ========================INFECTIOUS DISEASE===============   Monitor for fever / leukocytosis.  All surgical incision / chest tube  sites look clean         Pertinent clinical, laboratory, radiographic, hemodynamic, echocardiographic, respiratory data, microbiologic data and chart were reviewed and analyzed frequently throughout the course of the day and night. GI and DVT prophylaxis, glycemic control, head of bed elevation and skin care issues were addressed.  Patient seen, examined and plan discussed with CT Surgery / CTICU team during rounds.  Pt remains critically ill in imminent risk of  deterioration and requires very careful cardio- pulmonary monitoring and support.    I have spent    35    minutes of critical care time with this pt between     12  am  and  8  am         minutes spent on total encounter; more than 50% of the visit was spent counseling and/or coordinating care by the attending physician.        LESLEY Ryan MD

## 2018-10-16 NOTE — PROGRESS NOTE ADULT - SUBJECTIVE AND OBJECTIVE BOX
Anesthesia Pain Management Service    SUBJECTIVE: Patient is doing well with IV PCA and no significant problems reported.    Pain Scale Score	At rest: ___ 	With Activity: ___ 	[X ] Refer to charted pain scores    THERAPY:    [ ] IV PCA Morphine		[ ] 5 mg/mL	[ ] 1 mg/mL  [X ] IV PCA Hydromorphone	[ ] 5 mg/mL	[X ] 1 mg/mL  [ ] IV PCA Fentanyl		[ ] 50 micrograms/mL    Demand dose __0.2_ lockout __6_ (minutes) Continuous Rate _0__ Total: _5.3__  mg used (in past 24 hours)      MEDICATIONS  (STANDING):  docusate sodium 100 milliGRAM(s) Oral three times a day  famotidine    Tablet 20 milliGRAM(s) Oral two times a day  gabapentin 200 milliGRAM(s) Oral every 8 hours  heparin  Injectable 5000 Unit(s) SubCutaneous every 8 hours  HYDROmorphone PCA (1 mG/mL) 30 milliLiter(s) PCA Continuous PCA Continuous  lactated ringers. 1000 milliLiter(s) (30 mL/Hr) IV Continuous <Continuous>  metoclopramide Injectable 10 milliGRAM(s) IV Push once  potassium phosphate / sodium phosphate powder 1 Packet(s) Oral every 6 hours    MEDICATIONS  (PRN):  diphenhydrAMINE   Injectable 25 milliGRAM(s) IV Push every 4 hours PRN Pruritus  HYDROmorphone PCA (1 mG/mL) Rescue Clinician Bolus 0.5 milliGRAM(s) IV Push every 15 minutes PRN for Pain Scale GREATER THAN 6  naloxone Injectable 0.1 milliGRAM(s) IV Push every 3 minutes PRN For ANY of the following changes in patient status:  A. RR LESS THAN 10 breaths per minute, B. Oxygen saturation LESS THAN 90%, C. Sedation score of 6  ondansetron Injectable 4 milliGRAM(s) IV Push every 6 hours PRN Nausea      OBJECTIVE:    Sedation Score:	[ X] Alert	[ ] Drowsy 	[ ] Arousable	[ ] Asleep	[ ] Unresponsive    Side Effects:	[X ] None	[ ] Nausea	[ ] Vomiting	[ ] Pruritus  		[ ] Other:    Vital Signs Last 24 Hrs  T(C): 36.8 (16 Oct 2018 08:00), Max: 36.8 (16 Oct 2018 08:00)  T(F): 98.3 (16 Oct 2018 08:00), Max: 98.3 (16 Oct 2018 08:00)  HR: 90 (16 Oct 2018 08:00) (75 - 93)  BP: 112/69 (16 Oct 2018 08:00) (106/67 - 141/91)  BP(mean): 77 (16 Oct 2018 08:00) (71 - 102)  RR: 23 (16 Oct 2018 08:00) (7 - 34)  SpO2: 97% (16 Oct 2018 08:00) (94% - 99%)    ASSESSMENT/ PLAN    Therapy to  be:	[ X] Continue   [ ] Discontinued   [ ] Change to prn Analgesics    Documentation and Verification of current medications:   [X] Done	[ ] Not done, not elligible    Comments: Pt agrees to have Neurontin added as non-opioid adjuvant analgesic. Able to take deep breaths after use of IV PCA. Option of local anesthetic LUCY block offered if needed. Discussed with CT ICU attending as well.    Progress Note written now but Patient was seen earlier.

## 2018-10-16 NOTE — PROGRESS NOTE ADULT - SUBJECTIVE AND OBJECTIVE BOX
POST ANESTHESIA EVALUATION    52y Male POSTOP DAY 1 S/P     MENTAL STATUS: Patient participation [ X ] Awake     [  ] Arousable     [  ] Sedated    AIRWAY PATENCY: [X  ] Satisfactory  [  ] Other:     Vital Signs Last 24 Hrs  T(C): 36.8 (16 Oct 2018 08:00), Max: 36.8 (16 Oct 2018 08:00)  T(F): 98.3 (16 Oct 2018 08:00), Max: 98.3 (16 Oct 2018 08:00)  HR: 90 (16 Oct 2018 08:00) (75 - 93)  BP: 112/69 (16 Oct 2018 08:00) (106/67 - 141/91)  BP(mean): 77 (16 Oct 2018 08:00) (71 - 102)  RR: 23 (16 Oct 2018 08:00) (7 - 34)  SpO2: 97% (16 Oct 2018 08:00) (94% - 99%)  I&O's Summary    15 Oct 2018 07:01  -  16 Oct 2018 07:00  --------------------------------------------------------  IN: 580 mL / OUT: 2402 mL / NET: -1822 mL          NAUSEA/ VOMITTING:  [ X ] NONE  [  ] CONTROLLED [  ] OTHER     PAIN: [ X ] CONTROLLED WITH CURRENT REGIMEN  [  ] OTHER    [ X ] NO APPARENT ANESTHESIA COMPLICATIONS      Comments:

## 2018-10-17 LAB
BUN SERPL-MCNC: 9 MG/DL — SIGNIFICANT CHANGE UP (ref 7–23)
CALCIUM SERPL-MCNC: 8.4 MG/DL — SIGNIFICANT CHANGE UP (ref 8.4–10.5)
CHLORIDE SERPL-SCNC: 101 MMOL/L — SIGNIFICANT CHANGE UP (ref 98–107)
CO2 SERPL-SCNC: 25 MMOL/L — SIGNIFICANT CHANGE UP (ref 22–31)
CREAT SERPL-MCNC: 0.97 MG/DL — SIGNIFICANT CHANGE UP (ref 0.5–1.3)
GLUCOSE SERPL-MCNC: 96 MG/DL — SIGNIFICANT CHANGE UP (ref 70–99)
HCT VFR BLD CALC: 44.1 % — SIGNIFICANT CHANGE UP (ref 39–50)
HGB BLD-MCNC: 13.2 G/DL — SIGNIFICANT CHANGE UP (ref 13–17)
MCHC RBC-ENTMCNC: 21.6 PG — LOW (ref 27–34)
MCHC RBC-ENTMCNC: 29.9 % — LOW (ref 32–36)
MCV RBC AUTO: 72.2 FL — LOW (ref 80–100)
NRBC # FLD: 0 — SIGNIFICANT CHANGE UP
PLATELET # BLD AUTO: 197 K/UL — SIGNIFICANT CHANGE UP (ref 150–400)
PMV BLD: 10.2 FL — SIGNIFICANT CHANGE UP (ref 7–13)
POTASSIUM SERPL-MCNC: 4.2 MMOL/L — SIGNIFICANT CHANGE UP (ref 3.5–5.3)
POTASSIUM SERPL-SCNC: 4.2 MMOL/L — SIGNIFICANT CHANGE UP (ref 3.5–5.3)
RBC # BLD: 6.11 M/UL — HIGH (ref 4.2–5.8)
RBC # FLD: 16.3 % — HIGH (ref 10.3–14.5)
SODIUM SERPL-SCNC: 137 MMOL/L — SIGNIFICANT CHANGE UP (ref 135–145)
WBC # BLD: 7.12 K/UL — SIGNIFICANT CHANGE UP (ref 3.8–10.5)
WBC # FLD AUTO: 7.12 K/UL — SIGNIFICANT CHANGE UP (ref 3.8–10.5)

## 2018-10-17 PROCEDURE — 71045 X-RAY EXAM CHEST 1 VIEW: CPT | Mod: 26,77

## 2018-10-17 PROCEDURE — 71045 X-RAY EXAM CHEST 1 VIEW: CPT | Mod: 26

## 2018-10-17 RX ORDER — ACETAMINOPHEN 500 MG
1000 TABLET ORAL ONCE
Qty: 0 | Refills: 0 | Status: DISCONTINUED | OUTPATIENT
Start: 2018-10-17 | End: 2018-10-18

## 2018-10-17 RX ADMIN — HEPARIN SODIUM 5000 UNIT(S): 5000 INJECTION INTRAVENOUS; SUBCUTANEOUS at 05:44

## 2018-10-17 RX ADMIN — HEPARIN SODIUM 5000 UNIT(S): 5000 INJECTION INTRAVENOUS; SUBCUTANEOUS at 15:46

## 2018-10-17 RX ADMIN — Medication 100 MILLIGRAM(S): at 05:43

## 2018-10-17 RX ADMIN — Medication 100 MILLIGRAM(S): at 21:44

## 2018-10-17 RX ADMIN — HYDROMORPHONE HYDROCHLORIDE 30 MILLILITER(S): 2 INJECTION INTRAMUSCULAR; INTRAVENOUS; SUBCUTANEOUS at 19:16

## 2018-10-17 RX ADMIN — FAMOTIDINE 20 MILLIGRAM(S): 10 INJECTION INTRAVENOUS at 05:43

## 2018-10-17 RX ADMIN — GABAPENTIN 200 MILLIGRAM(S): 400 CAPSULE ORAL at 21:44

## 2018-10-17 RX ADMIN — HYDROMORPHONE HYDROCHLORIDE 30 MILLILITER(S): 2 INJECTION INTRAMUSCULAR; INTRAVENOUS; SUBCUTANEOUS at 07:03

## 2018-10-17 RX ADMIN — HYDROMORPHONE HYDROCHLORIDE 0.5 MILLIGRAM(S): 2 INJECTION INTRAMUSCULAR; INTRAVENOUS; SUBCUTANEOUS at 01:29

## 2018-10-17 RX ADMIN — Medication 100 MILLIGRAM(S): at 15:45

## 2018-10-17 RX ADMIN — HEPARIN SODIUM 5000 UNIT(S): 5000 INJECTION INTRAVENOUS; SUBCUTANEOUS at 21:44

## 2018-10-17 RX ADMIN — GABAPENTIN 200 MILLIGRAM(S): 400 CAPSULE ORAL at 15:46

## 2018-10-17 RX ADMIN — FAMOTIDINE 20 MILLIGRAM(S): 10 INJECTION INTRAVENOUS at 18:23

## 2018-10-17 RX ADMIN — GABAPENTIN 200 MILLIGRAM(S): 400 CAPSULE ORAL at 05:43

## 2018-10-17 NOTE — PROGRESS NOTE ADULT - SUBJECTIVE AND OBJECTIVE BOX
CARDIOTHORACIC SURGERY DAILY PROGRESS NOTE    PATIENT SEEN AT 10-17-18 @ 06:31    Subjective:  Patient seen and examined at bedside. No acute events overnight.     Vital Signs:  Vital Signs Last 24 Hrs  T(C): 37.5 (10-17-18 @ 08:00), Max: 37.5 (10-17-18 @ 08:00)  T(F): 99.5 (10-17-18 @ 08:00), Max: 99.5 (10-17-18 @ 08:00)  HR: 94 (10-17-18 @ 08:00) (71 - 99)  BP: 132/89 (10-17-18 @ 08:00) (105/68 - 141/98)  RR: 18 (10-17-18 @ 08:00) (16 - 27)  SpO2: 100% (10-17-18 @ 08:00) (94% - 100%) on (O2)    TELEMETRY: No events overnight.     10-16-18 @ 07:01  -  10-17-18 @ 07:00  --------------------------------------------------------  IN:    lactated ringers.: 630 mL    Oral Fluid: 960 mL  Total IN: 1590 mL    OUT:    Chest Tube: 130 mL    Chest Tube: 148 mL    Voided: 3750 mL  Total OUT: 4028 mL    Total NET: -2438 mL    PHYSICAL EXAM:  General: WN/WD NAD  Neurology: Awake, nonfocal, DIAZ x 4  Respiratory: Respirations unlabored.   CV: NSR on telemetry.   Abdominal: Soft, NT, ND  Psych: Oriented x 3, normal affect    Incisions: C/D/I    Tubes: R letty. 0cc/24h. No AL. | [facility]     Relevant labs, radiology and Medications reviewed                          13.2   7.12  )-----------( 197      ( 17 Oct 2018 06:00 )             44.1     10-17    137  |  101  |  9   ----------------------------<  96  4.2   |  25  |  0.97    Ca    8.4      17 Oct 2018 06:00  Phos  4.1     10-16  Mg     2.2     10-16    TPro  7.2  /  Alb  4.0  /  TBili  0.4  /  DBili  x   /  AST  67<H>  /  ALT  92<H>  /  AlkPhos  62  10-16    PT/INR - ( 16 Oct 2018 06:30 )   PT: 11.7 SEC;   INR: 1.02     PTT - ( 16 Oct 2018 06:30 )  PTT:28.6 SEC    MEDICATIONS  (STANDING):  acetaminophen  IVPB .. 1000 milliGRAM(s) IV Intermittent once  docusate sodium 100 milliGRAM(s) Oral three times a day  famotidine    Tablet 20 milliGRAM(s) Oral two times a day  gabapentin 200 milliGRAM(s) Oral every 8 hours  heparin  Injectable 5000 Unit(s) SubCutaneous every 8 hours  HYDROmorphone PCA (1 mG/mL) 30 milliLiter(s) PCA Continuous PCA Continuous  lactated ringers. 1000 milliLiter(s) (30 mL/Hr) IV Continuous <Continuous>  metoclopramide Injectable 10 milliGRAM(s) IV Push once    MEDICATIONS  (PRN):  diphenhydrAMINE   Injectable 25 milliGRAM(s) IV Push every 4 hours PRN Pruritus  HYDROmorphone PCA (1 mG/mL) Rescue Clinician Bolus 0.5 milliGRAM(s) IV Push every 15 minutes PRN for Pain Scale GREATER THAN 6  naloxone Injectable 0.1 milliGRAM(s) IV Push every 3 minutes PRN For ANY of the following changes in patient status:  A. RR LESS THAN 10 breaths per minute, B. Oxygen saturation LESS THAN 90%, C. Sedation score of 6  ondansetron Injectable 4 milliGRAM(s) IV Push every 6 hours PRN Nausea

## 2018-10-17 NOTE — PROGRESS NOTE ADULT - SUBJECTIVE AND OBJECTIVE BOX
Anesthesia Pain Management Service    SUBJECTIVE: Pt doing well with IV PCA without problems reported.    Therapy:	  [ X] IV PCA	   [ ] Epidural           [ ] s/p Spinal Opoid              [ ] Postpartum infusion	  [ ] Patient controlled regional anesthesia (PCRA)    [ ] prn Analgesics    Allergies    No Known Allergies    Intolerances      MEDICATIONS  (STANDING):  acetaminophen  IVPB .. 1000 milliGRAM(s) IV Intermittent once  docusate sodium 100 milliGRAM(s) Oral three times a day  famotidine    Tablet 20 milliGRAM(s) Oral two times a day  gabapentin 200 milliGRAM(s) Oral every 8 hours  heparin  Injectable 5000 Unit(s) SubCutaneous every 8 hours  HYDROmorphone PCA (1 mG/mL) 30 milliLiter(s) PCA Continuous PCA Continuous  lactated ringers. 1000 milliLiter(s) (30 mL/Hr) IV Continuous <Continuous>  metoclopramide Injectable 10 milliGRAM(s) IV Push once    MEDICATIONS  (PRN):  diphenhydrAMINE   Injectable 25 milliGRAM(s) IV Push every 4 hours PRN Pruritus  HYDROmorphone PCA (1 mG/mL) Rescue Clinician Bolus 0.5 milliGRAM(s) IV Push every 15 minutes PRN for Pain Scale GREATER THAN 6  naloxone Injectable 0.1 milliGRAM(s) IV Push every 3 minutes PRN For ANY of the following changes in patient status:  A. RR LESS THAN 10 breaths per minute, B. Oxygen saturation LESS THAN 90%, C. Sedation score of 6  ondansetron Injectable 4 milliGRAM(s) IV Push every 6 hours PRN Nausea      OBJECTIVE:   [X] No new signs     [ ] Other:    Side Effects:  [X ] None			[ ] Other:    Assessment of Catheter Site:		[ ] Intact		[ ] Other:    ASSESSMENT/PLAN  [ X] Continue current therapy    [ ] Therapy changed to:    [ ] IV PCA       [ ] Epidural     [ ] prn Analgesics     Comments:    Progress Note written now but Patient was seen earlier.

## 2018-10-17 NOTE — PROGRESS NOTE ADULT - ASSESSMENT
52M s/p robotic r vats, thymectomy, extensive [place] (10/15) POD2. Patient hemodynamically stable w/ FEAL in CT. Plan to d/c black and c/w CT on WS.     PLAN  Neuro:  - Pain management    Pulm:   - Encourage coughing, deep breathing and use of incentive spirometry.   - Daily CXR.     Cardio:   - Monitor telemetry/alarms    GI:   - Regular diet.   - Continue stool softeners.    Renal:   - Monitor uop  - Replete lytes prn    Vasc:   - VTE Ppx - SQH, SCD    Heme:   - Stable H/H.    ID:   - Off antibiotics. Stable.    Therapy:   - OOB/ambulate    Tubes:   - Monitor Chest tube output  - plan to d/c black and c/w CT to WS    Disposition:   - Anticipate d/c home when medically stable for discharge.     Discussed with Cardiothoracic Team at AM rounds.

## 2018-10-17 NOTE — PROGRESS NOTE ADULT - SUBJECTIVE AND OBJECTIVE BOX
Anesthesia Pain Management Service    SUBJECTIVE: Patient is doing well with IV PCA and no significant problems reported.    Pain Scale Score	At rest: _4_ 	With Activity: ___ 	[X ] Refer to charted pain scores    THERAPY:    [ ] IV PCA Morphine		[ ] 5 mg/mL	[ ] 1 mg/mL  [X ] IV PCA Hydromorphone	[ ] 5 mg/mL	[X ] 1 mg/mL  [ ] IV PCA Fentanyl		[ ] 50 micrograms/mL    Demand dose __0.2_ lockout __6_ (minutes) Continuous Rate _0__ Total: _5.4__  mg used (in past 24 hours)      MEDICATIONS  (STANDING):  acetaminophen  IVPB .. 1000 milliGRAM(s) IV Intermittent once  docusate sodium 100 milliGRAM(s) Oral three times a day  famotidine    Tablet 20 milliGRAM(s) Oral two times a day  gabapentin 200 milliGRAM(s) Oral every 8 hours  heparin  Injectable 5000 Unit(s) SubCutaneous every 8 hours  HYDROmorphone PCA (1 mG/mL) 30 milliLiter(s) PCA Continuous PCA Continuous  lactated ringers. 1000 milliLiter(s) (30 mL/Hr) IV Continuous <Continuous>  metoclopramide Injectable 10 milliGRAM(s) IV Push once    MEDICATIONS  (PRN):  diphenhydrAMINE   Injectable 25 milliGRAM(s) IV Push every 4 hours PRN Pruritus  HYDROmorphone PCA (1 mG/mL) Rescue Clinician Bolus 0.5 milliGRAM(s) IV Push every 15 minutes PRN for Pain Scale GREATER THAN 6  naloxone Injectable 0.1 milliGRAM(s) IV Push every 3 minutes PRN For ANY of the following changes in patient status:  A. RR LESS THAN 10 breaths per minute, B. Oxygen saturation LESS THAN 90%, C. Sedation score of 6  ondansetron Injectable 4 milliGRAM(s) IV Push every 6 hours PRN Nausea      OBJECTIVE: laying bed     Sedation Score:	[ X] Alert	[ ] Drowsy 	[ ] Arousable	[ ] Asleep	[ ] Unresponsive    Side Effects:	[X ] None	[ ] Nausea	[ ] Vomiting	[ ] Pruritus  		[ ] Other:    Vital Signs Last 24 Hrs  T(C): 37.5 (17 Oct 2018 08:00), Max: 37.5 (17 Oct 2018 08:00)  T(F): 99.5 (17 Oct 2018 08:00), Max: 99.5 (17 Oct 2018 08:00)  HR: 94 (17 Oct 2018 08:00) (72 - 99)  BP: 132/89 (17 Oct 2018 08:00) (108/61 - 141/98)  BP(mean): 99 (17 Oct 2018 08:00) (72 - 109)  RR: 18 (17 Oct 2018 08:00) (16 - 27)  SpO2: 100% (17 Oct 2018 08:00) (94% - 100%)    ASSESSMENT/ PLAN    Therapy to  be:	[ X] Continue   [ ] Discontinued   [ ] Change to prn Analgesics    Documentation and Verification of current medications:   [X] Done	[ ] Not done, not elligible    Comments: Continue current therapy

## 2018-10-18 ENCOUNTER — TRANSCRIPTION ENCOUNTER (OUTPATIENT)
Age: 53
End: 2018-10-18

## 2018-10-18 VITALS
RESPIRATION RATE: 18 BRPM | TEMPERATURE: 99 F | DIASTOLIC BLOOD PRESSURE: 72 MMHG | HEART RATE: 84 BPM | OXYGEN SATURATION: 96 % | SYSTOLIC BLOOD PRESSURE: 115 MMHG

## 2018-10-18 LAB
BUN SERPL-MCNC: 9 MG/DL — SIGNIFICANT CHANGE UP (ref 7–23)
CALCIUM SERPL-MCNC: 8.6 MG/DL — SIGNIFICANT CHANGE UP (ref 8.4–10.5)
CHLORIDE SERPL-SCNC: 101 MMOL/L — SIGNIFICANT CHANGE UP (ref 98–107)
CO2 SERPL-SCNC: 26 MMOL/L — SIGNIFICANT CHANGE UP (ref 22–31)
CREAT SERPL-MCNC: 0.93 MG/DL — SIGNIFICANT CHANGE UP (ref 0.5–1.3)
GLUCOSE SERPL-MCNC: 118 MG/DL — HIGH (ref 70–99)
HCT VFR BLD CALC: 46.1 % — SIGNIFICANT CHANGE UP (ref 39–50)
HGB BLD-MCNC: 13.9 G/DL — SIGNIFICANT CHANGE UP (ref 13–17)
MAGNESIUM SERPL-MCNC: 1.9 MG/DL — SIGNIFICANT CHANGE UP (ref 1.6–2.6)
MCHC RBC-ENTMCNC: 21.8 PG — LOW (ref 27–34)
MCHC RBC-ENTMCNC: 30.2 % — LOW (ref 32–36)
MCV RBC AUTO: 72.3 FL — LOW (ref 80–100)
NRBC # FLD: 0 — SIGNIFICANT CHANGE UP
PHOSPHATE SERPL-MCNC: 2.7 MG/DL — SIGNIFICANT CHANGE UP (ref 2.5–4.5)
PLATELET # BLD AUTO: 219 K/UL — SIGNIFICANT CHANGE UP (ref 150–400)
PMV BLD: 10.9 FL — SIGNIFICANT CHANGE UP (ref 7–13)
POTASSIUM SERPL-MCNC: 3.9 MMOL/L — SIGNIFICANT CHANGE UP (ref 3.5–5.3)
POTASSIUM SERPL-SCNC: 3.9 MMOL/L — SIGNIFICANT CHANGE UP (ref 3.5–5.3)
RBC # BLD: 6.38 M/UL — HIGH (ref 4.2–5.8)
RBC # FLD: 16.7 % — HIGH (ref 10.3–14.5)
SODIUM SERPL-SCNC: 139 MMOL/L — SIGNIFICANT CHANGE UP (ref 135–145)
WBC # BLD: 6.24 K/UL — SIGNIFICANT CHANGE UP (ref 3.8–10.5)
WBC # FLD AUTO: 6.24 K/UL — SIGNIFICANT CHANGE UP (ref 3.8–10.5)

## 2018-10-18 PROCEDURE — 71045 X-RAY EXAM CHEST 1 VIEW: CPT | Mod: 26

## 2018-10-18 PROCEDURE — 99238 HOSP IP/OBS DSCHRG MGMT 30/<: CPT

## 2018-10-18 RX ORDER — GABAPENTIN 400 MG/1
200 CAPSULE ORAL ONCE
Qty: 0 | Refills: 0 | Status: COMPLETED | OUTPATIENT
Start: 2018-10-18 | End: 2018-10-18

## 2018-10-18 RX ORDER — OXYCODONE HYDROCHLORIDE 5 MG/1
10 TABLET ORAL EVERY 6 HOURS
Qty: 0 | Refills: 0 | Status: DISCONTINUED | OUTPATIENT
Start: 2018-10-18 | End: 2018-10-18

## 2018-10-18 RX ORDER — ACETAMINOPHEN 500 MG
2 TABLET ORAL
Qty: 0 | Refills: 0 | COMMUNITY
Start: 2018-10-18

## 2018-10-18 RX ORDER — ACETAMINOPHEN 500 MG
650 TABLET ORAL ONCE
Qty: 0 | Refills: 0 | Status: DISCONTINUED | OUTPATIENT
Start: 2018-10-18 | End: 2018-10-18

## 2018-10-18 RX ORDER — OXYCODONE HYDROCHLORIDE 5 MG/1
1 TABLET ORAL
Qty: 20 | Refills: 0 | OUTPATIENT
Start: 2018-10-18 | End: 2018-10-22

## 2018-10-18 RX ORDER — SENNA PLUS 8.6 MG/1
1 TABLET ORAL
Qty: 7 | Refills: 0 | OUTPATIENT
Start: 2018-10-18 | End: 2018-10-24

## 2018-10-18 RX ORDER — SIMETHICONE 80 MG/1
80 TABLET, CHEWABLE ORAL ONCE
Qty: 0 | Refills: 0 | Status: COMPLETED | OUTPATIENT
Start: 2018-10-18 | End: 2018-10-18

## 2018-10-18 RX ORDER — OXYCODONE HYDROCHLORIDE 5 MG/1
5 TABLET ORAL EVERY 6 HOURS
Qty: 0 | Refills: 0 | Status: DISCONTINUED | OUTPATIENT
Start: 2018-10-18 | End: 2018-10-18

## 2018-10-18 RX ORDER — DOCUSATE SODIUM 100 MG
1 CAPSULE ORAL
Qty: 14 | Refills: 0 | OUTPATIENT
Start: 2018-10-18 | End: 2018-10-24

## 2018-10-18 RX ORDER — ACETAMINOPHEN 500 MG
650 TABLET ORAL EVERY 6 HOURS
Qty: 0 | Refills: 0 | Status: DISCONTINUED | OUTPATIENT
Start: 2018-10-18 | End: 2018-10-18

## 2018-10-18 RX ORDER — GABAPENTIN 400 MG/1
2 CAPSULE ORAL
Qty: 84 | Refills: 0 | OUTPATIENT
Start: 2018-10-18 | End: 2018-10-31

## 2018-10-18 RX ADMIN — HEPARIN SODIUM 5000 UNIT(S): 5000 INJECTION INTRAVENOUS; SUBCUTANEOUS at 13:05

## 2018-10-18 RX ADMIN — GABAPENTIN 200 MILLIGRAM(S): 400 CAPSULE ORAL at 15:59

## 2018-10-18 RX ADMIN — HYDROMORPHONE HYDROCHLORIDE 30 MILLILITER(S): 2 INJECTION INTRAMUSCULAR; INTRAVENOUS; SUBCUTANEOUS at 03:08

## 2018-10-18 RX ADMIN — Medication 100 MILLIGRAM(S): at 13:05

## 2018-10-18 RX ADMIN — GABAPENTIN 200 MILLIGRAM(S): 400 CAPSULE ORAL at 05:53

## 2018-10-18 RX ADMIN — FAMOTIDINE 20 MILLIGRAM(S): 10 INJECTION INTRAVENOUS at 05:53

## 2018-10-18 RX ADMIN — HYDROMORPHONE HYDROCHLORIDE 30 MILLILITER(S): 2 INJECTION INTRAMUSCULAR; INTRAVENOUS; SUBCUTANEOUS at 07:10

## 2018-10-18 RX ADMIN — Medication 100 MILLIGRAM(S): at 05:53

## 2018-10-18 RX ADMIN — SIMETHICONE 80 MILLIGRAM(S): 80 TABLET, CHEWABLE ORAL at 15:59

## 2018-10-18 RX ADMIN — HEPARIN SODIUM 5000 UNIT(S): 5000 INJECTION INTRAVENOUS; SUBCUTANEOUS at 05:53

## 2018-10-18 RX ADMIN — Medication 650 MILLIGRAM(S): at 15:50

## 2018-10-18 NOTE — DISCHARGE NOTE ADULT - CARE PROVIDER_API CALL
Umesh Xiao), Surgery; Thoracic Surgery  [address] [place] [address]  Phone: [phone]  Fax: [phone]

## 2018-10-18 NOTE — DISCHARGE NOTE ADULT - PATIENT PORTAL LINK FT
You can access the [facility] Patient Portal, offered by , by registering with the following website: [email or web address]

## 2018-10-18 NOTE — PROGRESS NOTE ADULT - PROVIDER SPECIALTY LIST ADULT
Anesthesia
Critical Care
Critical Care
Pain Medicine
Thoracic Surgery

## 2018-10-18 NOTE — PROGRESS NOTE ADULT - SUBJECTIVE AND OBJECTIVE BOX
Anesthesia Pain Management Service    SUBJECTIVE: Patient is doing well with IV PCA and no significant problems reported.    Pain Scale Score	At rest: __3_ 	With Activity: ___ 	[X ] Refer to charted pain scores    THERAPY:    [ ] IV PCA Morphine		[ ] 5 mg/mL	[ ] 1 mg/mL  [X ] IV PCA Hydromorphone	[ ] 5 mg/mL	[X ] 1 mg/mL  [ ] IV PCA Fentanyl		[ ] 50 micrograms/mL    Demand dose __0.2_ lockout __6_ (minutes) Continuous Rate _0__ Total: _4.4__   mg used (in past 24 hrs)      MEDICATIONS  (STANDING):  acetaminophen  IVPB .. 1000 milliGRAM(s) IV Intermittent once  docusate sodium 100 milliGRAM(s) Oral three times a day  famotidine    Tablet 20 milliGRAM(s) Oral two times a day  heparin  Injectable 5000 Unit(s) SubCutaneous every 8 hours  HYDROmorphone PCA (1 mG/mL) 30 milliLiter(s) PCA Continuous PCA Continuous  lactated ringers. 1000 milliLiter(s) (30 mL/Hr) IV Continuous <Continuous>  metoclopramide Injectable 10 milliGRAM(s) IV Push once    MEDICATIONS  (PRN):  diphenhydrAMINE   Injectable 25 milliGRAM(s) IV Push every 4 hours PRN Pruritus  HYDROmorphone PCA (1 mG/mL) Rescue Clinician Bolus 0.5 milliGRAM(s) IV Push every 15 minutes PRN for Pain Scale GREATER THAN 6  naloxone Injectable 0.1 milliGRAM(s) IV Push every 3 minutes PRN For ANY of the following changes in patient status:  A. RR LESS THAN 10 breaths per minute, B. Oxygen saturation LESS THAN 90%, C. Sedation score of 6  ondansetron Injectable 4 milliGRAM(s) IV Push every 6 hours PRN Nausea      OBJECTIVE: walking in hallway    Sedation Score:	[ X] Alert	[ ] Drowsy 	[ ] Arousable	[ ] Asleep	[ ] Unresponsive    Side Effects:	[X ] None	[ ] Nausea	[ ] Vomiting	[ ] Pruritus  		[ ] Other:    Vital Signs Last 24 Hrs  T(C): 36.5 (18 Oct 2018 08:00), Max: 37.1 (17 Oct 2018 15:20)  T(F): 97.7 (18 Oct 2018 08:00), Max: 98.8 (17 Oct 2018 15:20)  HR: 80 (18 Oct 2018 08:00) (79 - 103)  BP: 120/77 (18 Oct 2018 08:00) (115/68 - 138/78)  BP(mean): 92 (17 Oct 2018 11:41) (92 - 92)  RR: 17 (18 Oct 2018 08:00) (16 - 18)  SpO2: 95% (18 Oct 2018 08:00) (92% - 96%)    ASSESSMENT/ PLAN    Therapy to  be:	[ ] Continue   [ X] Discontinued   [X ] Change to prn Analgesics    Documentation and Verification of current medications:   [X] Done	[ ] Not done, not elligible    Comments: Plan to D/C after chest tubes are removed this morning   PRN Oral/IV opioids and/or Adjuvant medication to be ordered at this point.

## 2018-10-18 NOTE — DISCHARGE NOTE ADULT - CARE PLAN
Principal Discharge DX:	Thymoma  Goal:	Thymectomy  Assessment and plan of treatment:	You underwent a resection of your thymoma. You tolerated the procedure well and were discharged home on POD3 with outpatient follow up.

## 2018-10-18 NOTE — DISCHARGE NOTE ADULT - MEDICATION SUMMARY - MEDICATIONS TO TAKE
I will START or STAY ON the medications listed below when I get home from the hospital:    oxyCODONE 5 mg oral tablet  -- 1 tab(s) by mouth every 6 hours, As Needed -for severe pain MDD:4 tabs   -- Caution federal law prohibits the transfer of this drug to any person other  than the person for whom it was prescribed.  It is very important that you take or use this exactly as directed.  Do not skip doses or discontinue unless directed by your doctor.  May cause drowsiness.  Alcohol may intensify this effect.  Use care when operating dangerous machinery.  This prescription cannot be refilled.  Using more of this medication than prescribed may cause serious breathing problems.    -- Indication: For Severe Pain    acetaminophen 325 mg oral tablet  -- 2 tab(s) by mouth every 6 hours, As needed, Mild Pain (1 - 3)  -- Indication: For Mild - Moderate Pain    docusate sodium 100 mg oral capsule  -- 1 cap(s) by mouth 2 times a day -for constipation   -- Indication: For Constipation    senna 8.6 mg oral tablet  -- 1 tab(s) by mouth once a day (at bedtime), As Needed -for constipation   -- Indication: For Constipation

## 2018-10-18 NOTE — DISCHARGE NOTE ADULT - PLAN OF CARE
Thymectomy You underwent a resection of your thymoma. You tolerated the procedure well and were discharged home on POD3 with outpatient follow up.

## 2018-10-18 NOTE — DISCHARGE NOTE ADULT - NS AS ACTIVITY OBS
Stairs allowed/No Heavy lifting/straining/Sex allowed/Return to Work/School allowed/Walking-Indoors allowed/Showering allowed/Walking-Outdoors allowed

## 2018-10-18 NOTE — PROGRESS NOTE ADULT - SUBJECTIVE AND OBJECTIVE BOX
Anesthesia Pain Management Service- Attending Addendum    SUBJECTIVE: Patient's pain control adequate    Therapy:	  [ X] IV PCA	   [ ] Epidural           [ ] s/p Spinal Opoid              [ ] Postpartum infusion	  [ ] Patient controlled regional anesthesia (PCRA)    [ ] prn Analgesics    Allergies    No Known Allergies    Intolerances      MEDICATIONS  (STANDING):  acetaminophen  IVPB .. 1000 milliGRAM(s) IV Intermittent once  docusate sodium 100 milliGRAM(s) Oral three times a day  famotidine    Tablet 20 milliGRAM(s) Oral two times a day  heparin  Injectable 5000 Unit(s) SubCutaneous every 8 hours  metoclopramide Injectable 10 milliGRAM(s) IV Push once    MEDICATIONS  (PRN):  acetaminophen   Tablet .. 650 milliGRAM(s) Oral every 6 hours PRN Mild Pain (1 - 3)  oxyCODONE    IR 5 milliGRAM(s) Oral every 6 hours PRN Moderate Pain (4 - 6)  oxyCODONE    IR 10 milliGRAM(s) Oral every 6 hours PRN Severe Pain (7 - 10)      OBJECTIVE:   [X] No new signs     [ ] Other:    Side Effects:  [X ] None			[ ] Other:      ASSESSMENT/PLAN  -Discontinue current therapy    [ ] Therapy changed to:    [ ] IV PCA       [ ] Epidural     [ X] prn Analgesics     Comments: Pain management per primary team, APS to sign off

## 2018-11-13 ENCOUNTER — APPOINTMENT (OUTPATIENT)
Dept: THORACIC SURGERY | Facility: CLINIC | Age: 53
End: 2018-11-13
Payer: COMMERCIAL

## 2018-11-13 ENCOUNTER — OTHER (OUTPATIENT)
Age: 53
End: 2018-11-13

## 2018-11-13 VITALS
HEIGHT: 68 IN | OXYGEN SATURATION: 97 % | WEIGHT: 187 LBS | DIASTOLIC BLOOD PRESSURE: 85 MMHG | BODY MASS INDEX: 28.34 KG/M2 | SYSTOLIC BLOOD PRESSURE: 122 MMHG | TEMPERATURE: 98.3 F | RESPIRATION RATE: 15 BRPM | HEART RATE: 73 BPM

## 2018-11-13 PROCEDURE — 99024 POSTOP FOLLOW-UP VISIT: CPT

## 2018-12-18 ENCOUNTER — APPOINTMENT (OUTPATIENT)
Dept: THORACIC SURGERY | Facility: CLINIC | Age: 53
End: 2018-12-18
Payer: COMMERCIAL

## 2018-12-18 VITALS
DIASTOLIC BLOOD PRESSURE: 90 MMHG | BODY MASS INDEX: 28.95 KG/M2 | WEIGHT: 191 LBS | OXYGEN SATURATION: 80 % | SYSTOLIC BLOOD PRESSURE: 124 MMHG | HEART RATE: 80 BPM | HEIGHT: 68 IN | TEMPERATURE: 98.6 F | RESPIRATION RATE: 16 BRPM

## 2018-12-18 DIAGNOSIS — E32.8 OTHER DISEASES OF THYMUS: ICD-10-CM

## 2018-12-18 DIAGNOSIS — D49.89 NEOPLASM OF UNSPECIFIED BEHAVIOR OF OTHER SPECIFIED SITES: ICD-10-CM

## 2018-12-18 PROCEDURE — 99024 POSTOP FOLLOW-UP VISIT: CPT

## 2019-04-08 ENCOUNTER — TRANSCRIPTION ENCOUNTER (OUTPATIENT)
Age: 54
End: 2019-04-08

## 2019-09-01 ENCOUNTER — EMERGENCY (EMERGENCY)
Facility: HOSPITAL | Age: 54
LOS: 1 days | Discharge: HOME | End: 2019-09-01
Admitting: EMERGENCY MEDICINE
Payer: COMMERCIAL

## 2019-09-01 VITALS
RESPIRATION RATE: 16 BRPM | TEMPERATURE: 99 F | HEART RATE: 72 BPM | OXYGEN SATURATION: 97 % | DIASTOLIC BLOOD PRESSURE: 98 MMHG | SYSTOLIC BLOOD PRESSURE: 151 MMHG

## 2019-09-01 VITALS — TEMPERATURE: 100 F

## 2019-09-01 DIAGNOSIS — Z98.890 OTHER SPECIFIED POSTPROCEDURAL STATES: Chronic | ICD-10-CM

## 2019-09-01 PROCEDURE — 99283 EMERGENCY DEPT VISIT LOW MDM: CPT

## 2019-09-01 RX ORDER — SODIUM CHLORIDE 9 MG/ML
1000 INJECTION INTRAMUSCULAR; INTRAVENOUS; SUBCUTANEOUS ONCE
Refills: 0 | Status: COMPLETED | OUTPATIENT
Start: 2019-09-01 | End: 2019-09-01

## 2019-09-01 RX ADMIN — SODIUM CHLORIDE 1000 MILLILITER(S): 9 INJECTION INTRAMUSCULAR; INTRAVENOUS; SUBCUTANEOUS at 23:55

## 2019-09-01 NOTE — ED ADULT NURSE NOTE - OBJECTIVE STATEMENT
Stephanie RN: pt received in intake A&OX3 c/o bloody stool and diarrhea. pt had recent travel back from [facility] on Wednesday. reporting that diarrhea has lessened and now pt is seeing more bright red blood when having a BM. Pt appears comfortable in no acute or apparent distress at this time. Abd soft non distended. Denies fevers, N/V. 20G IV placed to R AC. Labs drawn and sent. Will continue to monitor.

## 2019-09-01 NOTE — ED ADULT TRIAGE NOTE - CHIEF COMPLAINT QUOTE
Patient c/o diarrhea and bright red blood observed in stool today. Patient states diarrhea started when he came back from [facility] on Wednesday. Denies any vomiting. Patient reports taking loperamide this AM and noticed blood after.  Denies any PMHx.

## 2019-09-01 NOTE — ED ADULT NURSE NOTE - CHIEF COMPLAINT QUOTE
Patient c/o diarrhea and bright red blood observed in stool today. Patient states diarrhea started when he came back from [facility] on Wednesday. Denies any vomiting. Patient reports taking loperamide this AM and noticed blood after.  Denies any PMHx.

## 2019-09-02 LAB
ALBUMIN SERPL ELPH-MCNC: 4.4 G/DL — SIGNIFICANT CHANGE UP (ref 3.3–5)
ALP SERPL-CCNC: 79 U/L — SIGNIFICANT CHANGE UP (ref 40–120)
ALT FLD-CCNC: 28 U/L — SIGNIFICANT CHANGE UP (ref 4–41)
ANION GAP SERPL CALC-SCNC: 11 MMO/L — SIGNIFICANT CHANGE UP (ref 7–14)
AST SERPL-CCNC: 26 U/L — SIGNIFICANT CHANGE UP (ref 4–40)
BASOPHILS # BLD AUTO: 0.03 K/UL — SIGNIFICANT CHANGE UP (ref 0–0.2)
BASOPHILS NFR BLD AUTO: 0.4 % — SIGNIFICANT CHANGE UP (ref 0–2)
BILIRUB SERPL-MCNC: 0.4 MG/DL — SIGNIFICANT CHANGE UP (ref 0.2–1.2)
BUN SERPL-MCNC: 6 MG/DL — LOW (ref 7–23)
CALCIUM SERPL-MCNC: 9.7 MG/DL — SIGNIFICANT CHANGE UP (ref 8.4–10.5)
CHLORIDE SERPL-SCNC: 101 MMOL/L — SIGNIFICANT CHANGE UP (ref 98–107)
CO2 SERPL-SCNC: 28 MMOL/L — SIGNIFICANT CHANGE UP (ref 22–31)
CREAT SERPL-MCNC: 0.98 MG/DL — SIGNIFICANT CHANGE UP (ref 0.5–1.3)
EOSINOPHIL # BLD AUTO: 0.13 K/UL — SIGNIFICANT CHANGE UP (ref 0–0.5)
EOSINOPHIL NFR BLD AUTO: 1.8 % — SIGNIFICANT CHANGE UP (ref 0–6)
GLUCOSE SERPL-MCNC: 106 MG/DL — HIGH (ref 70–99)
HCT VFR BLD CALC: 52.7 % — HIGH (ref 39–50)
HGB BLD-MCNC: 15.3 G/DL — SIGNIFICANT CHANGE UP (ref 13–17)
IMM GRANULOCYTES NFR BLD AUTO: 0.1 % — SIGNIFICANT CHANGE UP (ref 0–1.5)
LYMPHOCYTES # BLD AUTO: 2.37 K/UL — SIGNIFICANT CHANGE UP (ref 1–3.3)
LYMPHOCYTES # BLD AUTO: 33 % — SIGNIFICANT CHANGE UP (ref 13–44)
MCHC RBC-ENTMCNC: 21 PG — LOW (ref 27–34)
MCHC RBC-ENTMCNC: 29 % — LOW (ref 32–36)
MCV RBC AUTO: 72.4 FL — LOW (ref 80–100)
MONOCYTES # BLD AUTO: 0.94 K/UL — HIGH (ref 0–0.9)
MONOCYTES NFR BLD AUTO: 13.1 % — SIGNIFICANT CHANGE UP (ref 2–14)
NEUTROPHILS # BLD AUTO: 3.7 K/UL — SIGNIFICANT CHANGE UP (ref 1.8–7.4)
NEUTROPHILS NFR BLD AUTO: 51.6 % — SIGNIFICANT CHANGE UP (ref 43–77)
NRBC # FLD: 0 K/UL — SIGNIFICANT CHANGE UP (ref 0–0)
PLATELET # BLD AUTO: 261 K/UL — SIGNIFICANT CHANGE UP (ref 150–400)
PMV BLD: 9.4 FL — SIGNIFICANT CHANGE UP (ref 7–13)
POTASSIUM SERPL-MCNC: 4.6 MMOL/L — SIGNIFICANT CHANGE UP (ref 3.5–5.3)
POTASSIUM SERPL-SCNC: 4.6 MMOL/L — SIGNIFICANT CHANGE UP (ref 3.5–5.3)
PROT SERPL-MCNC: 8.1 G/DL — SIGNIFICANT CHANGE UP (ref 6–8.3)
RBC # BLD: 7.28 M/UL — HIGH (ref 4.2–5.8)
RBC # FLD: 18.3 % — HIGH (ref 10.3–14.5)
SODIUM SERPL-SCNC: 140 MMOL/L — SIGNIFICANT CHANGE UP (ref 135–145)
WBC # BLD: 7.18 K/UL — SIGNIFICANT CHANGE UP (ref 3.8–10.5)
WBC # FLD AUTO: 7.18 K/UL — SIGNIFICANT CHANGE UP (ref 3.8–10.5)

## 2019-09-02 RX ORDER — CIPROFLOXACIN LACTATE 400MG/40ML
1 VIAL (ML) INTRAVENOUS
Qty: 10 | Refills: 0
Start: 2019-09-02 | End: 2019-09-06

## 2019-09-02 RX ORDER — CIPROFLOXACIN LACTATE 400MG/40ML
500 VIAL (ML) INTRAVENOUS ONCE
Refills: 0 | Status: COMPLETED | OUTPATIENT
Start: 2019-09-02 | End: 2019-09-02

## 2019-09-02 RX ADMIN — Medication 500 MILLIGRAM(S): at 02:23

## 2019-09-02 NOTE — ED PROVIDER NOTE - OBJECTIVE STATEMENT
52 yo 52 yo M with no sig PMHX here with diarrhea x 4 day, 10 episodes per day, watery the first day now more formed but still soft stool, today pt began having BRBPR, x few episode. Pt reports discomfort in the LLQ. Recent travel to [facility] ,returned wednesday. Pt took Loperamide with no relief. No other sick contacts. Denies f,c,nausea, vomiting, dysuria, hematuria, cp ,sob.

## 2019-09-02 NOTE — ED PROVIDER NOTE - PATIENT PORTAL LINK FT
You can access the FollowMyHealth Patient Portal offered by [facility] by registering at the following website: [email or web address]. By joining [employer]’s FollowMyHealth portal, you will also be able to view your health information using other applications (apps) compatible with our system.

## 2019-09-02 NOTE — ED PROVIDER NOTE - CLINICAL SUMMARY MEDICAL DECISION MAKING FREE TEXT BOX
54 yo M with bloody diarrhea after travel. mild LLQ pain.   exam of abd benign no fevers, afebrile.   Plan for labs check electrolytes. treat for infectious/ travelers diarrhea with cipro for 5 days.

## 2019-09-02 NOTE — ED PROVIDER NOTE - NSFOLLOWUPINSTRUCTIONS_ED_ALL_ED_FT
Follow up with your PMD within 48-72 hours.  Rest, increase fluids. Take tylenol 650mg every 6 hours for pain .  Worsening or new fever, chills, weakness, nausea, vomiting, abdominal pain return to ER     Take Ciprofloxacin 500mg tablet twice a day for 5 days.     Follow with Gastroenterology. If you have issues obtaining follow up, please call: [phone] to obtain a doctor or specialist who takes your insurance in your area.

## 2020-01-08 ENCOUNTER — EMERGENCY (EMERGENCY)
Facility: HOSPITAL | Age: 55
LOS: 0 days | Discharge: HOME | End: 2020-01-08
Attending: EMERGENCY MEDICINE
Payer: OTHER MISCELLANEOUS

## 2020-01-08 VITALS
HEART RATE: 82 BPM | DIASTOLIC BLOOD PRESSURE: 98 MMHG | TEMPERATURE: 97 F | RESPIRATION RATE: 17 BRPM | OXYGEN SATURATION: 100 % | SYSTOLIC BLOOD PRESSURE: 140 MMHG | HEIGHT: 68 IN | WEIGHT: 179.9 LBS

## 2020-01-08 VITALS — SYSTOLIC BLOOD PRESSURE: 135 MMHG | HEART RATE: 76 BPM | DIASTOLIC BLOOD PRESSURE: 88 MMHG

## 2020-01-08 DIAGNOSIS — Z98.890 OTHER SPECIFIED POSTPROCEDURAL STATES: Chronic | ICD-10-CM

## 2020-01-08 PROCEDURE — 99283 EMERGENCY DEPT VISIT LOW MDM: CPT

## 2020-01-08 PROCEDURE — 99053 MED SERV 10PM-8AM 24 HR FAC: CPT

## 2020-01-08 RX ORDER — ACETAMINOPHEN 500 MG
975 TABLET ORAL ONCE
Refills: 0 | Status: COMPLETED | OUTPATIENT
Start: 2020-01-08 | End: 2020-01-08

## 2020-01-08 RX ADMIN — Medication 975 MILLIGRAM(S): at 06:50

## 2020-01-08 NOTE — ED PROVIDER NOTE - CLINICAL SUMMARY MEDICAL DECISION MAKING FREE TEXT BOX
55 yo M with posterior scalp contusion, no LOC, posterior headache, no neurological complaints otherwise, no indication for CT imaging at this time  -tylenol, ice, rest, f/u with neuro if headache persists or new concerning symptoms develop  Concerning neurological signs that would warrant return to ED were discussed with patient.  Pt. understands to return if severe headache, numbness, weakness, nausea, vomiting, or personality changes develop.  Pt. verbalizes understanding.
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
## 2020-01-08 NOTE — ED ADULT NURSE NOTE - NEURO ASSESSMENT
Problem: Knowledge Deficit  Goal: Patient/family/caregiver demonstrates understanding of disease process, treatment plan, medications, and discharge instructions  Complete learning assessment and assess knowledge base    Interventions:  - Provide teaching at level of understanding  - Provide teaching via preferred learning methods  Outcome: Progressing - - -

## 2020-01-08 NOTE — ED PROVIDER NOTE - PHYSICAL EXAMINATION
Vitals: HTN at 140/98, otherwise WNL  Gen: AAOx3, NAD, sitting comfortably in stretcher  Head: posterior scalp contusion 6x7 cm, no skin break, otherwise ncat, perrla, eomi b/l  Neck: supple, no lymphadenopathy, no midline deviation, normal rom, no posterior midline tenderness   Heart: rrr, no m/r/g  Lungs: CTA b/l, no rales/ronchi/wheezes  Abd: soft, nontender, non-distended, no rebound or guarding  Ext: no clubbing/cyanosis/edema  Neuro: sensation and muscle strength intact b/l, steady gait

## 2020-01-08 NOTE — ED PROVIDER NOTE - PATIENT PORTAL LINK FT
You can access the FollowMyHealth Patient Portal offered by [facility] by registering at the following website: [email or web address]. By joining [employer]’s FollowMyHealth portal, you will also be able to view your health information using other applications (apps) compatible with our system.
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
## 2020-01-10 DIAGNOSIS — S00.03XA CONTUSION OF SCALP, INITIAL ENCOUNTER: ICD-10-CM
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
## 2020-04-25 ENCOUNTER — MESSAGE (OUTPATIENT)
Age: 55
End: 2020-04-25

## 2020-05-04 LAB
SARS-COV-2 IGG SERPL IA-ACNC: 8.6 RATIO
SARS-COV-2 IGG SERPL QL IA: POSITIVE

## 2020-07-13 NOTE — ED ADULT TRIAGE NOTE - LOCATION:
Left arm;

## 2020-10-07 ENCOUNTER — OUTPATIENT (OUTPATIENT)
Dept: OUTPATIENT SERVICES | Facility: HOSPITAL | Age: 55
LOS: 1 days | End: 2020-10-07
Payer: COMMERCIAL

## 2020-10-07 DIAGNOSIS — Z11.59 ENCOUNTER FOR SCREENING FOR OTHER VIRAL DISEASES: ICD-10-CM

## 2020-10-07 DIAGNOSIS — Z98.890 OTHER SPECIFIED POSTPROCEDURAL STATES: Chronic | ICD-10-CM

## 2020-10-07 PROCEDURE — U0003: CPT

## 2020-10-08 LAB — SARS-COV-2 RNA SPEC QL NAA+PROBE: SIGNIFICANT CHANGE UP

## 2021-09-28 ENCOUNTER — EMERGENCY (EMERGENCY)
Facility: HOSPITAL | Age: 56
LOS: 1 days | Discharge: HOME | End: 2021-09-28
Attending: STUDENT IN AN ORGANIZED HEALTH CARE EDUCATION/TRAINING PROGRAM | Admitting: STUDENT IN AN ORGANIZED HEALTH CARE EDUCATION/TRAINING PROGRAM
Payer: COMMERCIAL

## 2021-09-28 VITALS
HEART RATE: 82 BPM | RESPIRATION RATE: 18 BRPM | OXYGEN SATURATION: 99 % | TEMPERATURE: 98 F | SYSTOLIC BLOOD PRESSURE: 140 MMHG | DIASTOLIC BLOOD PRESSURE: 89 MMHG | HEIGHT: 68 IN

## 2021-09-28 DIAGNOSIS — Z98.890 OTHER SPECIFIED POSTPROCEDURAL STATES: Chronic | ICD-10-CM

## 2021-09-28 LAB
ALBUMIN SERPL ELPH-MCNC: 4.1 G/DL — SIGNIFICANT CHANGE UP (ref 3.3–5)
ALP SERPL-CCNC: 72 U/L — SIGNIFICANT CHANGE UP (ref 40–120)
ALT FLD-CCNC: 45 U/L — HIGH (ref 4–41)
ANION GAP SERPL CALC-SCNC: 13 MMOL/L — SIGNIFICANT CHANGE UP (ref 7–14)
AST SERPL-CCNC: 35 U/L — SIGNIFICANT CHANGE UP (ref 4–40)
BASOPHILS # BLD AUTO: 0.04 K/UL — SIGNIFICANT CHANGE UP (ref 0–0.2)
BASOPHILS NFR BLD AUTO: 0.7 % — SIGNIFICANT CHANGE UP (ref 0–2)
BILIRUB SERPL-MCNC: <0.2 MG/DL — SIGNIFICANT CHANGE UP (ref 0.2–1.2)
BUN SERPL-MCNC: 15 MG/DL — SIGNIFICANT CHANGE UP (ref 7–23)
CALCIUM SERPL-MCNC: 9.3 MG/DL — SIGNIFICANT CHANGE UP (ref 8.4–10.5)
CHLORIDE SERPL-SCNC: 106 MMOL/L — SIGNIFICANT CHANGE UP (ref 98–107)
CO2 SERPL-SCNC: 21 MMOL/L — LOW (ref 22–31)
CREAT SERPL-MCNC: 0.94 MG/DL — SIGNIFICANT CHANGE UP (ref 0.5–1.3)
EOSINOPHIL # BLD AUTO: 0.17 K/UL — SIGNIFICANT CHANGE UP (ref 0–0.5)
EOSINOPHIL NFR BLD AUTO: 2.8 % — SIGNIFICANT CHANGE UP (ref 0–6)
GLUCOSE SERPL-MCNC: 120 MG/DL — HIGH (ref 70–99)
HCT VFR BLD CALC: 50.3 % — HIGH (ref 39–50)
HGB BLD-MCNC: 16 G/DL — SIGNIFICANT CHANGE UP (ref 13–17)
IANC: 2.58 K/UL — SIGNIFICANT CHANGE UP (ref 1.5–8.5)
IMM GRANULOCYTES NFR BLD AUTO: 0.2 % — SIGNIFICANT CHANGE UP (ref 0–1.5)
LYMPHOCYTES # BLD AUTO: 2.85 K/UL — SIGNIFICANT CHANGE UP (ref 1–3.3)
LYMPHOCYTES # BLD AUTO: 46.4 % — HIGH (ref 13–44)
MCHC RBC-ENTMCNC: 22.8 PG — LOW (ref 27–34)
MCHC RBC-ENTMCNC: 31.8 GM/DL — LOW (ref 32–36)
MCV RBC AUTO: 71.8 FL — LOW (ref 80–100)
MONOCYTES # BLD AUTO: 0.49 K/UL — SIGNIFICANT CHANGE UP (ref 0–0.9)
MONOCYTES NFR BLD AUTO: 8 % — SIGNIFICANT CHANGE UP (ref 2–14)
NEUTROPHILS # BLD AUTO: 2.58 K/UL — SIGNIFICANT CHANGE UP (ref 1.8–7.4)
NEUTROPHILS NFR BLD AUTO: 41.9 % — LOW (ref 43–77)
NRBC # BLD: 0 /100 WBCS — SIGNIFICANT CHANGE UP
NRBC # FLD: 0 K/UL — SIGNIFICANT CHANGE UP
PLATELET # BLD AUTO: 250 K/UL — SIGNIFICANT CHANGE UP (ref 150–400)
POTASSIUM SERPL-MCNC: 3.9 MMOL/L — SIGNIFICANT CHANGE UP (ref 3.5–5.3)
POTASSIUM SERPL-SCNC: 3.9 MMOL/L — SIGNIFICANT CHANGE UP (ref 3.5–5.3)
PROT SERPL-MCNC: 7 G/DL — SIGNIFICANT CHANGE UP (ref 6–8.3)
RBC # BLD: >7 M/UL — HIGH (ref 4.2–5.8)
RBC # FLD: 17.7 % — HIGH (ref 10.3–14.5)
SARS-COV-2 RNA SPEC QL NAA+PROBE: SIGNIFICANT CHANGE UP
SODIUM SERPL-SCNC: 140 MMOL/L — SIGNIFICANT CHANGE UP (ref 135–145)
TROPONIN T, HIGH SENSITIVITY RESULT: <6 NG/L — SIGNIFICANT CHANGE UP
WBC # BLD: 6.14 K/UL — SIGNIFICANT CHANGE UP (ref 3.8–10.5)
WBC # FLD AUTO: 6.14 K/UL — SIGNIFICANT CHANGE UP (ref 3.8–10.5)

## 2021-09-28 PROCEDURE — 93010 ELECTROCARDIOGRAM REPORT: CPT

## 2021-09-28 PROCEDURE — 71046 X-RAY EXAM CHEST 2 VIEWS: CPT | Mod: 26

## 2021-09-28 PROCEDURE — 99285 EMERGENCY DEPT VISIT HI MDM: CPT | Mod: 25

## 2021-09-28 RX ORDER — ACETAMINOPHEN 500 MG
650 TABLET ORAL ONCE
Refills: 0 | Status: COMPLETED | OUTPATIENT
Start: 2021-09-28 | End: 2021-09-28

## 2021-09-28 RX ORDER — ONDANSETRON 8 MG/1
4 TABLET, FILM COATED ORAL ONCE
Refills: 0 | Status: COMPLETED | OUTPATIENT
Start: 2021-09-28 | End: 2021-09-28

## 2021-09-28 RX ORDER — LIDOCAINE 4 G/100G
1 CREAM TOPICAL ONCE
Refills: 0 | Status: COMPLETED | OUTPATIENT
Start: 2021-09-28 | End: 2021-09-28

## 2021-09-28 RX ORDER — BUPIVACAINE HCL/PF 7.5 MG/ML
10 VIAL (ML) INJECTION ONCE
Refills: 0 | Status: DISCONTINUED | OUTPATIENT
Start: 2021-09-28 | End: 2021-10-01

## 2021-09-28 RX ORDER — KETOROLAC TROMETHAMINE 30 MG/ML
30 SYRINGE (ML) INJECTION ONCE
Refills: 0 | Status: DISCONTINUED | OUTPATIENT
Start: 2021-09-28 | End: 2021-09-28

## 2021-09-28 RX ORDER — LIDOCAINE 4 G/100G
1 CREAM TOPICAL ONCE
Refills: 0 | Status: DISCONTINUED | OUTPATIENT
Start: 2021-09-28 | End: 2021-10-01

## 2021-09-28 RX ORDER — ASPIRIN/CALCIUM CARB/MAGNESIUM 324 MG
162 TABLET ORAL ONCE
Refills: 0 | Status: COMPLETED | OUTPATIENT
Start: 2021-09-28 | End: 2021-09-28

## 2021-09-28 RX ADMIN — ONDANSETRON 4 MILLIGRAM(S): 8 TABLET, FILM COATED ORAL at 10:43

## 2021-09-28 RX ADMIN — Medication 162 MILLIGRAM(S): at 10:42

## 2021-09-28 RX ADMIN — Medication 30 MILLIGRAM(S): at 10:43

## 2021-09-28 RX ADMIN — Medication 650 MILLIGRAM(S): at 10:42

## 2021-09-28 RX ADMIN — LIDOCAINE 1 PATCH: 4 CREAM TOPICAL at 10:43

## 2021-09-28 NOTE — ED PROVIDER NOTE - OBJECTIVE STATEMENT
55 year-old-male lung mass s/p resection  who presents to the emergency department with back pain in lower lumbar back  also had a shooting pain under left nipple  no upper thoracic back pain  currently does not have chest pain  pain originates in lower lumbar central back with radiation to bilateraly lower lumbar spinal muscles  no trauma, no saddle anesthesia, urinary retention, weakness, numbness   no shortness of breath, chest pain is non exertional  last stress test 2 years ago was normal

## 2021-09-28 NOTE — ED ADULT NURSE NOTE - NSIMPLEMENTINTERV_GEN_ALL_ED
Implemented All Universal Safety Interventions:  [place] to call system. Call bell, personal items and telephone within reach. Instruct patient to call for assistance. Room bathroom lighting operational. Non-slip footwear when patient is off stretcher. Physically safe environment: no spills, clutter or unnecessary equipment. Stretcher in lowest position, wheels locked, appropriate side rails in place.

## 2021-09-28 NOTE — ED PROVIDER NOTE - PROGRESS NOTE DETAILS
pain improved, but still has pain, acs work up negative, will follow up his own cardiologist, given ortho spine follow up

## 2021-09-28 NOTE — ED PROVIDER NOTE - PHYSICAL EXAMINATION
CONSTITUTIONAL: Non-toxic, non-diaphoretic, in no apparent distress  HEAD: Normocephalic; atruamatic  EYES: EOM intact   ENMT: External appears normal; normal oropharynx, moist  NECK: grossly normal active ROM,  CARD: No cyanosis, good peripheral perfusion, RRR  RESP: Normal chest excursion with respiration; no increased work of breathing  ABD: non-distended   EXT: moving all extremities, no gross disfigurement or asymmetry,  SKIN: Warm, dry, no rash  NEURO:  moving all extremities, no facial droop, no dysarthria      cn2-12 intact  5/5 all extremities

## 2021-09-28 NOTE — ED PROVIDER NOTE - PATIENT PORTAL LINK FT
You can access the FollowMyHealth Patient Portal offered by [facility] by registering at the following website: [email or web address]. By joining [employer]’s FollowMyHealth portal, you will also be able to view your health information using other applications (apps) compatible with our system.

## 2021-09-28 NOTE — ED PROVIDER NOTE - CLINICAL SUMMARY MEDICAL DECISION MAKING FREE TEXT BOX
rule out acs, not likely a PE, pulses equal no neuro deficit not likely a dissection; no red flags for back pain, will treat back pain.

## 2021-09-28 NOTE — ED PROVIDER NOTE - NS ED ROS FT
CONSTITUTIONAL: No fever,  EYES: No redness  ENT: no sore throat  CARDIOVASCULAR: +++ chest pain,  RESPIRATORY: No cough, no shortness of breath  GI: No abdominal pain, no nausea, no vomiting,  GENITOURINARY: No dysuria  MUSKULOSKELETAL: +++ new pain in joints/muscles  SKIN: No rash  NEURO: No headache  ALL OTHER SYSTEMS NEGATIVE.

## 2022-09-19 NOTE — H&P PST ADULT - ALCOHOL USE HISTORY SINGLE SELECT
"[facility] at [facility]  IP Progress Note      Chief Complaint: Systolic heart failure    Subjective   Patient is feeling much better but it still short of breath he denies any chest pain any lower extremity edema any paroxysmal nocturnal dyspnea.  He has started eating good.    Objective     Blood pressure 150/64, pulse 72, temperature 96.1 °F (35.6 °C), temperature source Axillary, resp. rate 18, height 177.8 cm (70\"), weight 88 kg (193 lb 14.4 oz), SpO2 95 %.     Intake/Output Summary (Last 24 hours) at 9/19/2022 1143  Last data filed at 9/19/2022 0700  Gross per 24 hour   Intake --   Output 800 ml   Net -800 ml       Physical Exam:  General: No acute distress.   Neck: no JVD.  Chest:No respiratory distress, breath sounds are normal. No wheezes,  rhonchi or rales.  Cardiovascular: Normal S1 and S2, no murmer, gallop or rub.    Extremities: No edema.    Results Review:     I reviewed the patient's new clinical results.    Medication:    reviewed      Results from last 7 days   Lab Units 09/18/22  0455   WBC 10*3/mm3 3.98   HEMOGLOBIN g/dL 10.2*   HEMATOCRIT % 32.6*   PLATELETS 10*3/mm3 133*     Results from last 7 days   Lab Units 09/19/22  0554 09/15/22  1020 09/14/22  1732   SODIUM mmol/L 143   < > 143   POTASSIUM mmol/L 3.5   < > 4.6   CHLORIDE mmol/L 106   < > 106   CO2 mmol/L 23.0   < > 20.0*   BUN mg/dL 15   < > 18   CREATININE mg/dL 0.85   < > 1.62*   CALCIUM mg/dL 9.3   < > 9.5   BILIRUBIN mg/dL  --   --  1.1   ALK PHOS U/L  --   --  76   ALT (SGPT) U/L  --   --  30   AST (SGOT) U/L  --   --  47*   GLUCOSE mg/dL 103*   < > 99    < > = values in this interval not displayed.         Lab Results   Component Value Date    TROPONINI 0.029 05/29/2017    TROPONINT 0.019 09/14/2022                 Iron   Date Value Ref Range Status   07/08/2021 49 (L) 59 - 158 mcg/dL Final     Iron Saturation   Date Value Ref Range Status   07/08/2021 11 (L) 20 - 50 % Final     [facility]   Date Value Ref Range " Status   07/08/2021 463 298 - 536 mcg/dL Final      Hemoglobin A1C   Date Value Ref Range Status   07/29/2022 6.50 (H) 4.80 - 5.60 % Final     Magnesium   Date Value Ref Range Status   09/17/2022 2.1 1.6 - 2.4 mg/dL Final        Tele: Sinus Rythym      Assessment:  1.   Chronic systolic heart failure acute    · EF 15 to 20%    2.  Essential hypertension:    · Hypertensive heart disease    3. Coronary artery disease involving native coronary artery of native heart with angina pectoris (HCC):    · Stable   4.  CKD (chronic kidney disease) stage 2, GFR 60-89 ml/min with    Acute kidney injury (HCC):    · Now recovering      5. Type 2 diabetes mellitus (HCC)      Plan:    1.  Patient most likely will need a little higher dose of diuretics to stay comfortable.  He might benefit from switching Lasix to Bumex and also adding Zaroxolyn as needed.    2.  His renal function has been stable.  We might want to add spironolactone to his medications and watch his kidney function.    3.  He will definitely benefit from iron infusion.    4.  We will continue monitoring in the hospital.                                 yes...

## 2022-12-28 ENCOUNTER — EMERGENCY (EMERGENCY)
Facility: HOSPITAL | Age: 57
LOS: 1 days | Discharge: HOME | End: 2022-12-28
Attending: STUDENT IN AN ORGANIZED HEALTH CARE EDUCATION/TRAINING PROGRAM | Admitting: STUDENT IN AN ORGANIZED HEALTH CARE EDUCATION/TRAINING PROGRAM
Payer: COMMERCIAL

## 2022-12-28 VITALS
OXYGEN SATURATION: 100 % | SYSTOLIC BLOOD PRESSURE: 163 MMHG | HEART RATE: 102 BPM | DIASTOLIC BLOOD PRESSURE: 89 MMHG | RESPIRATION RATE: 20 BRPM | TEMPERATURE: 98 F

## 2022-12-28 VITALS
OXYGEN SATURATION: 100 % | HEART RATE: 70 BPM | SYSTOLIC BLOOD PRESSURE: 136 MMHG | DIASTOLIC BLOOD PRESSURE: 84 MMHG | RESPIRATION RATE: 16 BRPM

## 2022-12-28 DIAGNOSIS — Z98.890 OTHER SPECIFIED POSTPROCEDURAL STATES: Chronic | ICD-10-CM

## 2022-12-28 LAB
ALBUMIN SERPL ELPH-MCNC: 4 G/DL — SIGNIFICANT CHANGE UP (ref 3.3–5)
ALP SERPL-CCNC: 77 U/L — SIGNIFICANT CHANGE UP (ref 40–120)
ALT FLD-CCNC: 40 U/L — SIGNIFICANT CHANGE UP (ref 4–41)
ANION GAP SERPL CALC-SCNC: 12 MMOL/L — SIGNIFICANT CHANGE UP (ref 7–14)
APTT BLD: 30.4 SEC — SIGNIFICANT CHANGE UP (ref 27–36.3)
AST SERPL-CCNC: 30 U/L — SIGNIFICANT CHANGE UP (ref 4–40)
BASE EXCESS BLDV CALC-SCNC: 3.2 MMOL/L — HIGH (ref -2–3)
BASOPHILS # BLD AUTO: 0 K/UL — SIGNIFICANT CHANGE UP (ref 0–0.2)
BASOPHILS NFR BLD AUTO: 0 % — SIGNIFICANT CHANGE UP (ref 0–2)
BILIRUB SERPL-MCNC: <0.2 MG/DL — SIGNIFICANT CHANGE UP (ref 0.2–1.2)
BLOOD GAS VENOUS COMPREHENSIVE RESULT: SIGNIFICANT CHANGE UP
BUN SERPL-MCNC: 13 MG/DL — SIGNIFICANT CHANGE UP (ref 7–23)
CALCIUM SERPL-MCNC: 9 MG/DL — SIGNIFICANT CHANGE UP (ref 8.4–10.5)
CHLORIDE BLDV-SCNC: 101 MMOL/L — SIGNIFICANT CHANGE UP (ref 96–108)
CHLORIDE SERPL-SCNC: 103 MMOL/L — SIGNIFICANT CHANGE UP (ref 98–107)
CO2 BLDV-SCNC: 32.1 MMOL/L — HIGH (ref 22–26)
CO2 SERPL-SCNC: 24 MMOL/L — SIGNIFICANT CHANGE UP (ref 22–31)
CREAT SERPL-MCNC: 0.94 MG/DL — SIGNIFICANT CHANGE UP (ref 0.5–1.3)
EGFR: 94 ML/MIN/1.73M2 — SIGNIFICANT CHANGE UP
EOSINOPHIL # BLD AUTO: 0.26 K/UL — SIGNIFICANT CHANGE UP (ref 0–0.5)
EOSINOPHIL NFR BLD AUTO: 4.3 % — SIGNIFICANT CHANGE UP (ref 0–6)
FLUAV AG NPH QL: SIGNIFICANT CHANGE UP
FLUBV AG NPH QL: SIGNIFICANT CHANGE UP
GAS PNL BLDV: 136 MMOL/L — SIGNIFICANT CHANGE UP (ref 136–145)
GIANT PLATELETS BLD QL SMEAR: PRESENT — SIGNIFICANT CHANGE UP
GLUCOSE BLDV-MCNC: 94 MG/DL — SIGNIFICANT CHANGE UP (ref 70–99)
GLUCOSE SERPL-MCNC: 97 MG/DL — SIGNIFICANT CHANGE UP (ref 70–99)
HCO3 BLDV-SCNC: 30 MMOL/L — HIGH (ref 22–29)
HCT VFR BLD CALC: 51 % — HIGH (ref 39–50)
HCT VFR BLDA CALC: 47 % — SIGNIFICANT CHANGE UP (ref 39–51)
HGB BLD CALC-MCNC: 15.5 G/DL — SIGNIFICANT CHANGE UP (ref 13–17)
HGB BLD-MCNC: 15 G/DL — SIGNIFICANT CHANGE UP (ref 13–17)
IANC: 2.5 K/UL — SIGNIFICANT CHANGE UP (ref 1.8–7.4)
INR BLD: 1.05 RATIO — SIGNIFICANT CHANGE UP (ref 0.88–1.16)
LACTATE BLDV-MCNC: 2.2 MMOL/L — HIGH (ref 0.5–2)
LYMPHOCYTES # BLD AUTO: 2.37 K/UL — SIGNIFICANT CHANGE UP (ref 1–3.3)
LYMPHOCYTES # BLD AUTO: 39.7 % — SIGNIFICANT CHANGE UP (ref 13–44)
MANUAL SMEAR VERIFICATION: SIGNIFICANT CHANGE UP
MCHC RBC-ENTMCNC: 21 PG — LOW (ref 27–34)
MCHC RBC-ENTMCNC: 29.4 GM/DL — LOW (ref 32–36)
MCV RBC AUTO: 71.5 FL — LOW (ref 80–100)
MONOCYTES # BLD AUTO: 0.67 K/UL — SIGNIFICANT CHANGE UP (ref 0–0.9)
MONOCYTES NFR BLD AUTO: 11.2 % — SIGNIFICANT CHANGE UP (ref 2–14)
NEUTROPHILS # BLD AUTO: 2.31 K/UL — SIGNIFICANT CHANGE UP (ref 1.8–7.4)
NEUTROPHILS NFR BLD AUTO: 38.8 % — LOW (ref 43–77)
NT-PROBNP SERPL-SCNC: 6 PG/ML — SIGNIFICANT CHANGE UP
PCO2 BLDV: 55 MMHG — SIGNIFICANT CHANGE UP (ref 42–55)
PH BLDV: 7.35 — SIGNIFICANT CHANGE UP (ref 7.32–7.43)
PLAT MORPH BLD: NORMAL — SIGNIFICANT CHANGE UP
PLATELET # BLD AUTO: 254 K/UL — SIGNIFICANT CHANGE UP (ref 150–400)
PLATELET COUNT - ESTIMATE: NORMAL — SIGNIFICANT CHANGE UP
PO2 BLDV: 37 MMHG — SIGNIFICANT CHANGE UP
POIKILOCYTOSIS BLD QL AUTO: SLIGHT — SIGNIFICANT CHANGE UP
POTASSIUM BLDV-SCNC: 3.9 MMOL/L — SIGNIFICANT CHANGE UP (ref 3.5–5.1)
POTASSIUM SERPL-MCNC: 3.9 MMOL/L — SIGNIFICANT CHANGE UP (ref 3.5–5.3)
POTASSIUM SERPL-SCNC: 3.9 MMOL/L — SIGNIFICANT CHANGE UP (ref 3.5–5.3)
PROT SERPL-MCNC: 7 G/DL — SIGNIFICANT CHANGE UP (ref 6–8.3)
PROTHROM AB SERPL-ACNC: 12.2 SEC — SIGNIFICANT CHANGE UP (ref 10.5–13.4)
RBC # BLD: >7 M/UL — HIGH (ref 4.2–5.8)
RBC # FLD: 17.4 % — HIGH (ref 10.3–14.5)
RBC BLD AUTO: ABNORMAL
RSV RNA NPH QL NAA+NON-PROBE: SIGNIFICANT CHANGE UP
SAO2 % BLDV: 64.5 % — SIGNIFICANT CHANGE UP
SARS-COV-2 RNA SPEC QL NAA+PROBE: SIGNIFICANT CHANGE UP
SODIUM SERPL-SCNC: 139 MMOL/L — SIGNIFICANT CHANGE UP (ref 135–145)
TARGETS BLD QL SMEAR: SLIGHT — SIGNIFICANT CHANGE UP
TROPONIN T, HIGH SENSITIVITY RESULT: <6 NG/L — SIGNIFICANT CHANGE UP
VARIANT LYMPHS # BLD: 6 % — SIGNIFICANT CHANGE UP (ref 0–6)
WBC # BLD: 5.96 K/UL — SIGNIFICANT CHANGE UP (ref 3.8–10.5)
WBC # FLD AUTO: 5.96 K/UL — SIGNIFICANT CHANGE UP (ref 3.8–10.5)

## 2022-12-28 PROCEDURE — 71275 CT ANGIOGRAPHY CHEST: CPT | Mod: 26,MA

## 2022-12-28 PROCEDURE — 99285 EMERGENCY DEPT VISIT HI MDM: CPT

## 2022-12-28 PROCEDURE — 74174 CTA ABD&PLVS W/CONTRAST: CPT | Mod: 26,MA

## 2022-12-28 PROCEDURE — 93010 ELECTROCARDIOGRAM REPORT: CPT

## 2022-12-28 RX ORDER — ACETAMINOPHEN 500 MG
650 TABLET ORAL ONCE
Refills: 0 | Status: COMPLETED | OUTPATIENT
Start: 2022-12-28 | End: 2022-12-28

## 2022-12-28 RX ADMIN — Medication 650 MILLIGRAM(S): at 13:42

## 2022-12-28 NOTE — ED ADULT NURSE NOTE - OBJECTIVE STATEMENT
A&Ox4, PMH of "surgery for mass in chest" in 2018, presents to ED c/o chest pain radiating to L shoulder down L arm since Monday, came in for worsening pain. Respirations are even and unlabored, sating at 99% on RA, NSR on cardiac monitor, awaiting orders.

## 2022-12-28 NOTE — ED PROVIDER NOTE - PROGRESS NOTE DETAILS
no aortic dissection, trop <6. patient feels improved. lipase 144, patient aware, will follow with PCP for rpt. Patient has cardiologist for prompt f/u. patient cleared for dc home.

## 2022-12-28 NOTE — ED PROVIDER NOTE - ATTENDING APP SHARED VISIT CONTRIBUTION OF CARE
48 yo M hx mediastinal mass/thymoma s/p removal pw c/o L sided chest pain radiating to neck/shoulder/back with associated paresthesias down L arm that started after waking up from a nap on Monday. Pain has been constant, minimal relief with OTC meds. denies injuries/falls. Hx stress test 2018-normal. On exam, well appearing, NAD, hypertensive , pulses symmetric b/l, neuro intact. Plan for labs, EKG, trop, CTA r/o aortic dissection.

## 2022-12-28 NOTE — ED ADULT NURSE NOTE - NSIMPLEMENTINTERV_GEN_ALL_ED
Implemented All Universal Safety Interventions:  [place] to call system. Call bell, personal items and telephone within reach. Instruct patient to call for assistance. Room bathroom lighting operational. Non-slip footwear when patient is off stretcher. Physically safe environment: no spills, clutter or unnecessary equipment. Stretcher in lowest position, wheels locked, appropriate side rails in place.

## 2022-12-28 NOTE — ED PROVIDER NOTE - CLINICAL SUMMARY MEDICAL DECISION MAKING FREE TEXT BOX
49-year-old male with past medical history of mediastinal mass/thymoma s/p removal in 2018 here with complaint of left-sided chest pain radiating to the left upper back, constant in nature, no exacerbating or alleviating factors, associated with left arm tingling x3 days.  Patient reports intermittent shortness of breath.   r/o dissection  labs, trop, CTA c/a/p   tylenol for pain.

## 2022-12-28 NOTE — ED PROVIDER NOTE - PATIENT PORTAL LINK FT
You can access the FollowMyHealth Patient Portal offered by [facility] by registering at the following website: [email or web address]. By joining [employer]’s FollowMyHealth portal, you will also be able to view your health information using other applications (apps) compatible with our system.

## 2022-12-28 NOTE — ED PROVIDER NOTE - NS ED ATTENDING STATEMENT MOD
This was a shared visit with the SUDHIR. I reviewed and verified the documentation and independently performed the documented:

## 2022-12-28 NOTE — ED PROVIDER NOTE - OBJECTIVE STATEMENT
49-year-old male with past medical history of mediastinal mass/thymoma s/p removal here with complaint of left-sided chest pain radiating to the left upper back, constant in nature, no exacerbating or alleviating factors, associated with left arm tingling x3 days.  Patient reports intermittent shortness of breath.  Denies headache, dizziness, fevers, chills, nausea, vomiting, cough, leg swelling, abdominal pain, dysuria, hematuria.

## 2022-12-28 NOTE — ED ADULT TRIAGE NOTE - CHIEF COMPLAINT QUOTE
states" I am having chest pain radiating to my back since Monday " h/o lung mass and surgery done 5 years ago.

## 2022-12-28 NOTE — ED PROVIDER NOTE - NSFOLLOWUPINSTRUCTIONS_ED_ALL_ED_FT
FOLLOW UP WITH CARDIOLOGIST AND/ OR YOUR PRIMARY MEDICAL DOCTOR IN 2-3 DAYS OR WITHIN THE WEEK.     SHOW COPIES OF YOUR RESULTS/REPORTS GIVEN TO YOU.      Take all of your other medications as previously prescribed.     Worsening or continued chest pain, shortness of breath, weakness, or any other new or concerning symptoms return to ED.

## 2022-12-28 NOTE — ED ADULT NURSE REASSESSMENT NOTE - NS ED NURSE REASSESS COMMENT FT1
pt A&Ox4 , respirations even and unlabored, completing full sentences. pt is comfortable in stretcher at this time, no new complaints at this time. VS as charted, NSR on CM. bed in lowest position, siderails up. MD at bedside for eval.

## 2023-02-27 NOTE — DISCHARGE NOTE ADULT - ADDITIONAL INSTRUCTIONS
Pts wife called in stating he went to  his medication synthroid and it usually is $1 but is is not 40, she was wanting the generic version of it called in
Sent rx.
Please call [phone] to schedule a follow up appointment with Dr. Xiao in 2 weeks following your hospital discharge. Please obtain a chest x-ray as an outpatient prior to your follow up appointment and bring the images to your appointment.  You have been provided a prescription for the x-ray at discharge.    Take the chest tube dressing off 48 hours after your hospital discharge.  After your dressing comes off, you may shower.  Please take down the dressing and shower with warm water and soap.  Do not scrub your incisions.  Leave the dressings off after your shower.  You have steri-strips in place on your incision, these will fall off on their own.  There is a suture at the site of your chest tube, this will be removed at your follow up appointment.     You may take Tylenol 650mg every 6 hours as needed for mild to moderate pain.  For severe pain, you have been prescribed Oxycodone 5mg every 6 hours as needed.  Please take this medication as prescribed and do not drive while taking oxycodone pain medication.     Please schedule an appointment for follow up with your primary doctor following your hospital discharge.     If you notice that you have severe or worsening pain, shortness of breath, nausea and vomiting, or if you have a fever of greater than 101.0F please call the contact information listed for the office.

## 2023-06-24 NOTE — PATIENT PROFILE ADULT - NSPROPTRIGHTNOTIFY_GEN_A_NUR
[Normal] : affect appropriate [de-identified] : reduced breath sounds in right base [de-identified] : Right breast with architectural distortion and post radiation skin changes ; no palpable lesions in either breast; no skin dimpling or nipple discharge yes

## 2024-02-23 NOTE — ASU PATIENT PROFILE, ADULT - MUTUALITY COMMENT, PROFILE
Additional EKG Note... Discussed with Pt his ability to have questions answered before going into the OR